# Patient Record
Sex: FEMALE | Race: BLACK OR AFRICAN AMERICAN | NOT HISPANIC OR LATINO | Employment: FULL TIME | URBAN - METROPOLITAN AREA
[De-identification: names, ages, dates, MRNs, and addresses within clinical notes are randomized per-mention and may not be internally consistent; named-entity substitution may affect disease eponyms.]

---

## 2020-12-17 ENCOUNTER — OFFICE VISIT (OUTPATIENT)
Dept: URGENT CARE | Facility: CLINIC | Age: 55
End: 2020-12-17
Payer: COMMERCIAL

## 2020-12-17 VITALS
WEIGHT: 162 LBS | BODY MASS INDEX: 31.8 KG/M2 | SYSTOLIC BLOOD PRESSURE: 158 MMHG | TEMPERATURE: 98 F | OXYGEN SATURATION: 99 % | RESPIRATION RATE: 14 BRPM | HEIGHT: 60 IN | DIASTOLIC BLOOD PRESSURE: 88 MMHG | HEART RATE: 80 BPM

## 2020-12-17 DIAGNOSIS — U07.1 COVID-19 VIRUS DETECTED: ICD-10-CM

## 2020-12-17 DIAGNOSIS — Z03.818 ENCOUNTER FOR OBSERVATION FOR SUSPECTED EXPOSURE TO OTHER BIOLOGICAL AGENTS RULED OUT: ICD-10-CM

## 2020-12-17 DIAGNOSIS — U07.1 COVID-19: Primary | ICD-10-CM

## 2020-12-17 LAB
CTP QC/QA: YES
SARS-COV-2 RDRP RESP QL NAA+PROBE: POSITIVE

## 2020-12-17 PROCEDURE — 99203 OFFICE O/P NEW LOW 30 MIN: CPT | Mod: S$GLB,,, | Performed by: PHYSICIAN ASSISTANT

## 2020-12-17 PROCEDURE — U0002 COVID-19 LAB TEST NON-CDC: HCPCS | Mod: QW,S$GLB,, | Performed by: PHYSICIAN ASSISTANT

## 2020-12-17 PROCEDURE — U0002: ICD-10-PCS | Mod: QW,S$GLB,, | Performed by: PHYSICIAN ASSISTANT

## 2020-12-17 PROCEDURE — 3008F BODY MASS INDEX DOCD: CPT | Mod: CPTII,S$GLB,, | Performed by: PHYSICIAN ASSISTANT

## 2020-12-17 PROCEDURE — 99203 PR OFFICE/OUTPT VISIT, NEW, LEVL III, 30-44 MIN: ICD-10-PCS | Mod: S$GLB,,, | Performed by: PHYSICIAN ASSISTANT

## 2020-12-17 PROCEDURE — 3008F PR BODY MASS INDEX (BMI) DOCUMENTED: ICD-10-PCS | Mod: CPTII,S$GLB,, | Performed by: PHYSICIAN ASSISTANT

## 2020-12-17 RX ORDER — ERGOCALCIFEROL 1.25 MG/1
50000 CAPSULE ORAL
COMMUNITY
End: 2024-01-26

## 2020-12-17 RX ORDER — OMEPRAZOLE 10 MG/1
20 CAPSULE, DELAYED RELEASE ORAL DAILY PRN
COMMUNITY
End: 2024-01-26

## 2020-12-17 RX ORDER — PROMETHAZINE HYDROCHLORIDE AND DEXTROMETHORPHAN HYDROBROMIDE 6.25; 15 MG/5ML; MG/5ML
5 SYRUP ORAL 3 TIMES DAILY PRN
Qty: 180 ML | Refills: 0 | Status: SHIPPED | OUTPATIENT
Start: 2020-12-17 | End: 2020-12-27

## 2020-12-17 NOTE — PROGRESS NOTES
Subjective:       Patient ID: Erasmo Dickens is a 55 y.o. female.    Vitals:  height is 5' (1.524 m) and weight is 73.5 kg (162 lb). Her temperature is 97.5 °F (36.4 °C). Her blood pressure is 158/88 (abnormal) and her pulse is 80. Her respiration is 14 and oxygen saturation is 99%.     Chief Complaint: Nasal Congestion    Patient endorses 7 day history of loss of smile, sinus congestion, and mild intermittent dry cough.  Afebrile.  Denies any sick contacts that she is aware of.  Denies chest pain, shortness of breath, GI upset or abdominal pain.    Other  This is a new problem. The current episode started yesterday. The problem occurs constantly. The problem has been unchanged. Associated symptoms include congestion and coughing. Pertinent negatives include no abdominal pain, anorexia, arthralgias, change in bowel habit, chest pain, chills, diaphoresis, fatigue, fever, headaches, joint swelling, myalgias, nausea, neck pain, numbness, rash, sore throat, swollen glands, urinary symptoms, vertigo, visual change, vomiting or weakness. Nothing aggravates the symptoms. She has tried acetaminophen (tylenol) for the symptoms. The treatment provided mild relief.       Constitution: Negative for chills, sweating, fatigue and fever.   HENT: Positive for congestion and sinus pressure. Negative for sore throat.    Neck: Negative for neck pain and painful lymph nodes.   Cardiovascular: Negative for chest pain and leg swelling.   Eyes: Negative for double vision and blurred vision.   Respiratory: Positive for cough. Negative for sputum production and shortness of breath.    Gastrointestinal: Negative for abdominal pain, nausea, vomiting and diarrhea.   Genitourinary: Negative for dysuria, frequency, urgency and history of kidney stones.   Musculoskeletal: Negative for joint pain, joint swelling, muscle cramps and muscle ache.   Skin: Negative for color change, pale, rash and bruising.   Allergic/Immunologic: Negative for seasonal  allergies.   Neurological: Negative for dizziness, history of vertigo, light-headedness, passing out, headaches and numbness.   Hematologic/Lymphatic: Negative for swollen lymph nodes.   Psychiatric/Behavioral: Negative for nervous/anxious, sleep disturbance and depression. The patient is not nervous/anxious.        Objective:      Physical Exam   Constitutional: She is oriented to person, place, and time. She appears well-developed. She is cooperative.  Non-toxic appearance. She does not appear ill. No distress.   HENT:   Head: Normocephalic and atraumatic.   Ears:   Right Ear: Hearing, external ear and ear canal normal. Tympanic membrane is not injected, not erythematous, not retracted and not bulging. A middle ear effusion (mild; serous) is present. impacted cerumen  Left Ear: Hearing, external ear and ear canal normal. Tympanic membrane is not injected, not erythematous, not retracted and not bulging.  No middle ear effusion. impacted cerumen  Nose: Nose normal. No mucosal edema, rhinorrhea, nasal deformity or congestion. No epistaxis. Right sinus exhibits no maxillary sinus tenderness and no frontal sinus tenderness. Left sinus exhibits no maxillary sinus tenderness and no frontal sinus tenderness.   Mouth/Throat: Uvula is midline, oropharynx is clear and moist and mucous membranes are normal. No trismus in the jaw. Normal dentition. No uvula swelling. No oropharyngeal exudate, posterior oropharyngeal edema, posterior oropharyngeal erythema, tonsillar abscesses or cobblestoning. No tonsillar exudate.   Eyes: Conjunctivae and lids are normal. Right eye exhibits no discharge. Left eye exhibits no discharge. No scleral icterus.   Neck: Trachea normal, full passive range of motion without pain and phonation normal. Neck supple. No neck rigidity. No edema and no erythema present.   Cardiovascular: Normal rate, regular rhythm, normal heart sounds and normal pulses. Exam reveals no gallop and no friction rub.   No  murmur heard.  Pulmonary/Chest: Effort normal and breath sounds normal. No stridor. No respiratory distress. She has no decreased breath sounds. She has no wheezes. She has no rhonchi. She has no rales.   CTA bilaterally; NAD    Comments: CTA bilaterally; NAD    Abdominal: Normal appearance.   Musculoskeletal: Normal range of motion.         General: No deformity.   Lymphadenopathy:        Head (right side): No submandibular and no tonsillar adenopathy present.        Head (left side): No submandibular and no tonsillar adenopathy present.     She has no cervical adenopathy.        Right cervical: No superficial cervical and no posterior cervical adenopathy present.       Left cervical: No superficial cervical and no posterior cervical adenopathy present.   Neurological: She is alert and oriented to person, place, and time. She exhibits normal muscle tone. Coordination normal.   Skin: Skin is warm, dry, intact, not diaphoretic and not pale. Psychiatric: Her speech is normal and behavior is normal. Judgment and thought content normal.   Nursing note and vitals reviewed.        Results for orders placed or performed in visit on 12/17/20   POCT COVID-19 Rapid Screening   Result Value Ref Range    POC Rapid COVID Positive (A) Negative     Acceptable Yes        Assessment:       1. COVID-19    2. Encounter for observation for suspected exposure to other biological agents ruled out        Plan:       Covid-19 molecular testing done today and is positive at this time. Reviewed results with patient. This is a viral infection and will require no antibiotics at this time for treatment. Discussed to quarantine for 10 days from symptom onset and the last 3 days must be fever free without fever reducing medicines before resuming normal daily activities. Discussed ER precautions with patient and should they develop any chest pain or shortness of breath that does not resolve with rest, they should go to the emergency  room.     Rest and fluids are important.  Please take tylenol for help with fever, pain.  Mucinex can help with chest congestion.  Tessalon perrles can be used as directed as needed to help with cough.  Albuterol inhaler can be used as directed needed for wheezing as we have discussed.    If you develop worsening symptoms, especially shortness of breath or difficulty breathing, please go to the ED for further evaluation.    COVID-19  -     promethazine-dextromethorphan (PROMETHAZINE-DM) 6.25-15 mg/5 mL Syrp; Take 5 mLs by mouth 3 (three) times daily as needed (cough).  Dispense: 180 mL; Refill: 0    Encounter for observation for suspected exposure to other biological agents ruled out  -     POCT COVID-19 Rapid Screening      Patient Instructions   Instructions for Patients with Confirmed or Suspected COVID-19    If you are awaiting your test result, you will either be called or it will be released to the patient portal.  If you have any questions about your test, please visit [email or web address] or call our COVID-19 information line at [phone].      Instructions for non-hospitalized or discharged patients with confirmed or suspected COVID-19:       Stay home except to get medical care.    Separate yourself from other people and animals in your home.    Call ahead before visiting your doctor.    Wear a face mask.    Cover your coughs and sneezes.    Clean your hands often.    Avoid sharing personal household items.    Clean all high-touch surfaces every day.    Monitor your symptoms. Seek prompt medical attention if your illness is worsening (e.g., difficulty breathing). Before seeking care, call your healthcare provider.    If you have a medical emergency and must call 911, notify the dispatcher that you have or are being evaluated for COVID-19. If possible, put on a face mask before emergency medical services arrive.    Use the following symptom-based strategy to return to normal  activity following a suspected or confirmed case of COVID-19. Continue isolation until:   o At least 3 days (72 hours) have passed since recovery defined as resolution of fever without the use of fever-reducing medications and improvement in respiratory symptoms (e.g. cough, shortness of breath), and   o At least 10 days have passed since the first positive test.       As one of the next steps, you will receive a call or text from the [place] Department of Health ([facility]) COVID-19 Tracing Team. See the contact information below so you know not to ignore the health departments call. It is important that you contact them back immediately so they can help.     Contact Tracer Number:  [phone]  Caller ID for most carriers: [facility]    What is contact tracing?   Contact tracing is a process that helps identify everyone who has been in close contact with an infected person. Contact tracers let those people know they may have been exposed and guide them on next steps. Confidentiality is important for everyone; no one will be told who may have exposed them to the virus.   Your involvement is important. The more we know about where and how this virus is spreading, the better chance we have at stopping it from spreading further.  What does exposure mean?   Exposure means you have been within 6 feet for more than 15 minutes with a person who has or had COVID-19.  What kind of questions do the contact tracers ask?   A contact tracer will confirm your basic contact information including name, address, phone number, and next of kin, as well as asking about any symptoms you may have had. Theyll also ask you how you think you may have gotten sick, such as places where you may have been exposed to the virus, and people you were with. Those names will never be shared with anyone outside of that call, and will only be used to help trace and stop the spread of the virus.   I have privacy concerns. How will the state  use my information?   Your privacy about your health is important. All calls are completed using call centers that use the appropriate health privacy protection measures (HIPAA compliance), meaning that your patient information is safe. No one will ever ask you any questions related to immigration status. Your health comes first.   Do I have to participate?   You do not have to participate, but we strongly encourage you to. Contact tracing can help us catch and control new outbreaks as theyre developing to keep your friends and family safe.   What if I dont hear from anyone?   If you dont receive a call within 24 hours, you can call the number above right away to inquire about your status. That line is open from 8:00 am - 8:00 p.m., 7 days a week.  Contact tracing saves lives! Together, we have the power to beat this virus and keep our loved ones and neighbors safe.       Instructions for household members, intimate partners and caregivers in a non-healthcare setting of a patient with confirmed or suspected COVID-19:         Close contacts should monitor their health and call their healthcare provider right away if they develop symptoms suggestive of COVID-19 (e.g., fever, cough, shortness of breath).    Stay home except to get medical care. Separate yourself from other people and animals in the home.   Monitor the patients symptoms. If the patient is getting sicker, call his or her healthcare provider. If the patient has a medical emergency and you need to call 911, notify the dispatch personnel that the patient has or is being evaluated for COVID-19.    Wear a facemask when around other people such as sharing a room or vehicle and before entering a healthcare provider's office.   Cover coughs and sneezes with a tissue. Throw used tissues in a lined trash can immediately and wash hands.   Clean hands often with soap and water for at least 20 seconds or with an alcohol-based hand , rubbing  hands together until they feel dry. Avoid touching your eyes, nose, and mouth with unwashed hands.   Clean all high-touch; surfaces every day, including counters, tabletops, doorknobs, bathroom fixtures, toilets, phones, keyboards, tablets, bedside tables, etc. Use a household cleaning spray or wipe according to label instructions.   Avoid sharing personal household items such as dishes, drinking glasses, cups, towels, bedding, etc. After these items are used, they should be washed thoroughly with soap and water.   Continue isolation until:   At least 3 days (72 hours) have passed since recovery defined as resolution of fever without the use of fever-reducing medications and improvement in respiratory symptoms (e.g. cough, shortness of breath), and    At least 10 days have passed since the patients first positive test.    https://www.cdc.gov/coronavirus/2019-ncov/your-health/index.htm      Please note that patients who test positive for COVID-19 are required by the CDC to undergo isolation for 10 days after their symptoms first began.    This isolation starts from the day you first developed symptoms, not the day of your positive test. For example, if your symptoms began on a Monday but tested positive on the following Wednesday, your 10-day isolation begins from that Monday, not the Wednesday you tested positive.    However, if you are asymptomatic (a person who does not have any symptoms) and COVID-19 positive, your 10-day isolation begins on the day you tested positive, regardless of exposure date.  Also, per the CDC guidelines, once your 10 days have passed, and you have not had fever greater than 100.4F in the last 24 hours without taking any fever reducers such as Tylenol (Acetaminophen) or Motrin (Ibuprofen), you may return to your normal activities including social distancing, wearing masks, and frequent handwashing - YOU DO NOT NEED ANOTHER TEST IN ORDER TO END YOUR QUARANTINE.      Please follow up  with your Primary care provider within 2-5 days if your signs and symptoms have not resolved or worsen.     If your condition worsens or fails to improve we recommend that you receive another evaluation at the emergency room immediately or contact your primary medical clinic to discuss your concerns.   You must understand that you have received an Urgent Care treatment only and that you may be released before all of your medical problems are known or treated. You, the patient, will arrange for follow up care as instructed.     RED FLAGS/WARNING SYMPTOMS DISCUSSED WITH PATIENT THAT WOULD WARRANT EMERGENT MEDICAL ATTENTION. PATIENT VERBALIZED UNDERSTANDING.

## 2020-12-17 NOTE — LETTER
[address] [facility] ? [place], [address] ? Phone [phone] ? Fax [phone]           Return to Work/School    Patient: Erasmo Dickens  YOB: 1965   Date: 12/17/2020      To Whom It May Concern:     Erasmo Dickens was in contact with/seen in my office on 12/17/2020. COVID-19 is present in our communities across the state. Not all patients are eligible or appropriate to be tested. In this situation, your employee meets the following criteria:     Erasmo Dickens has met the criteria for COVID-19 testing and has a POSITIVE result. She can return to work once they are asymptomatic for 24 hours without the use of fever reducing medications AND at least ten days from the start of symptoms (or from the first positive result if they have no symptoms).      If you have any questions or concerns, or if I can be of further assistance, please do not hesitate to contact me.     Sincerely,      Joslyn Benjamin PA-C

## 2020-12-18 NOTE — PATIENT INSTRUCTIONS
Instructions for Patients with Confirmed or Suspected COVID-19    If you are awaiting your test result, you will either be called or it will be released to the patient portal.  If you have any questions about your test, please visit [email or web address] or call our COVID-19 information line at [phone].      Instructions for non-hospitalized or discharged patients with confirmed or suspected COVID-19:       Stay home except to get medical care.    Separate yourself from other people and animals in your home.    Call ahead before visiting your doctor.    Wear a face mask.    Cover your coughs and sneezes.    Clean your hands often.    Avoid sharing personal household items.    Clean all high-touch surfaces every day.    Monitor your symptoms. Seek prompt medical attention if your illness is worsening (e.g., difficulty breathing). Before seeking care, call your healthcare provider.    If you have a medical emergency and must call 911, notify the dispatcher that you have or are being evaluated for COVID-19. If possible, put on a face mask before emergency medical services arrive.    Use the following symptom-based strategy to return to normal activity following a suspected or confirmed case of COVID-19. Continue isolation until:   o At least 3 days (72 hours) have passed since recovery defined as resolution of fever without the use of fever-reducing medications and improvement in respiratory symptoms (e.g. cough, shortness of breath), and   o At least 10 days have passed since the first positive test.       As one of the next steps, you will receive a call or text from the [place] Department of Health ([facility]) COVID-19 Tracing Team. See the contact information below so you know not to ignore the health departments call. It is important that you contact them back immediately so they can help.     Contact Tracer Number:  [phone]  Caller ID for most carriers: [place] [facility]    What is  contact tracing?   Contact tracing is a process that helps identify everyone who has been in close contact with an infected person. Contact tracers let those people know they may have been exposed and guide them on next steps. Confidentiality is important for everyone; no one will be told who may have exposed them to the virus.   Your involvement is important. The more we know about where and how this virus is spreading, the better chance we have at stopping it from spreading further.  What does exposure mean?   Exposure means you have been within 6 feet for more than 15 minutes with a person who has or had COVID-19.  What kind of questions do the contact tracers ask?   A contact tracer will confirm your basic contact information including name, address, phone number, and next of kin, as well as asking about any symptoms you may have had. Theyll also ask you how you think you may have gotten sick, such as places where you may have been exposed to the virus, and people you were with. Those names will never be shared with anyone outside of that call, and will only be used to help trace and stop the spread of the virus.   I have privacy concerns. How will the state use my information?   Your privacy about your health is important. All calls are completed using call centers that use the appropriate health privacy protection measures (HIPAA compliance), meaning that your patient information is safe. No one will ever ask you any questions related to immigration status. Your health comes first.   Do I have to participate?   You do not have to participate, but we strongly encourage you to. Contact tracing can help us catch and control new outbreaks as theyre developing to keep your friends and family safe.   What if I dont hear from anyone?   If you dont receive a call within 24 hours, you can call the number above right away to inquire about your status. That line is open from 8:00 am - 8:00 p.m., 7 days a  week.  Contact tracing saves lives! Together, we have the power to beat this virus and keep our loved ones and neighbors safe.       Instructions for household members, intimate partners and caregivers in a non-healthcare setting of a patient with confirmed or suspected COVID-19:         Close contacts should monitor their health and call their healthcare provider right away if they develop symptoms suggestive of COVID-19 (e.g., fever, cough, shortness of breath).    Stay home except to get medical care. Separate yourself from other people and animals in the home.   Monitor the patients symptoms. If the patient is getting sicker, call his or her healthcare provider. If the patient has a medical emergency and you need to call 911, notify the dispatch personnel that the patient has or is being evaluated for COVID-19.    Wear a facemask when around other people such as sharing a room or vehicle and before entering a healthcare provider's office.   Cover coughs and sneezes with a tissue. Throw used tissues in a lined trash can immediately and wash hands.   Clean hands often with soap and water for at least 20 seconds or with an alcohol-based hand , rubbing hands together until they feel dry. Avoid touching your eyes, nose, and mouth with unwashed hands.   Clean all high-touch; surfaces every day, including counters, tabletops, doorknobs, bathroom fixtures, toilets, phones, keyboards, tablets, bedside tables, etc. Use a household cleaning spray or wipe according to label instructions.   Avoid sharing personal household items such as dishes, drinking glasses, cups, towels, bedding, etc. After these items are used, they should be washed thoroughly with soap and water.   Continue isolation until:   At least 3 days (72 hours) have passed since recovery defined as resolution of fever without the use of fever-reducing medications and improvement in respiratory symptoms (e.g. cough, shortness of breath),  and    At least 10 days have passed since the patients first positive test.    https://www.cdc.gov/coronavirus/2019-ncov/your-health/index.htm      Please note that patients who test positive for COVID-19 are required by the CDC to undergo isolation for 10 days after their symptoms first began.    This isolation starts from the day you first developed symptoms, not the day of your positive test. For example, if your symptoms began on a Monday but tested positive on the following Wednesday, your 10-day isolation begins from that Monday, not the Wednesday you tested positive.    However, if you are asymptomatic (a person who does not have any symptoms) and COVID-19 positive, your 10-day isolation begins on the day you tested positive, regardless of exposure date.  Also, per the CDC guidelines, once your 10 days have passed, and you have not had fever greater than 100.4F in the last 24 hours without taking any fever reducers such as Tylenol (Acetaminophen) or Motrin (Ibuprofen), you may return to your normal activities including social distancing, wearing masks, and frequent handwashing - YOU DO NOT NEED ANOTHER TEST IN ORDER TO END YOUR QUARANTINE.      Please follow up with your Primary care provider within 2-5 days if your signs and symptoms have not resolved or worsen.     If your condition worsens or fails to improve we recommend that you receive another evaluation at the emergency room immediately or contact your primary medical clinic to discuss your concerns.   You must understand that you have received an Urgent Care treatment only and that you may be released before all of your medical problems are known or treated. You, the patient, will arrange for follow up care as instructed.     RED FLAGS/WARNING SYMPTOMS DISCUSSED WITH PATIENT THAT WOULD WARRANT EMERGENT MEDICAL ATTENTION. PATIENT VERBALIZED UNDERSTANDING.

## 2023-09-11 ENCOUNTER — OFFICE VISIT (OUTPATIENT)
Dept: URGENT CARE | Facility: CLINIC | Age: 58
End: 2023-09-11
Payer: COMMERCIAL

## 2023-09-11 VITALS
SYSTOLIC BLOOD PRESSURE: 158 MMHG | HEART RATE: 75 BPM | DIASTOLIC BLOOD PRESSURE: 90 MMHG | TEMPERATURE: 98 F | WEIGHT: 162 LBS | HEIGHT: 60 IN | RESPIRATION RATE: 16 BRPM | BODY MASS INDEX: 31.8 KG/M2 | OXYGEN SATURATION: 98 %
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
## 2023-09-11 DIAGNOSIS — Z02.6 ENCOUNTER RELATED TO WORKER'S COMPENSATION CLAIM: ICD-10-CM

## 2023-09-11 LAB
CTP QC/QA: YES
POC 5 PANEL DRUG SCREEN: NEGATIVE

## 2023-09-11 PROCEDURE — 80305 POCT RAPID DRUG SCREEN 5 PANEL: ICD-10-PCS | Mod: S$GLB,,, | Performed by: NURSE PRACTITIONER

## 2023-09-11 PROCEDURE — 99212 PR OFFICE/OUTPT VISIT, EST, LEVL II, 10-19 MIN: ICD-10-PCS | Mod: S$GLB,,, | Performed by: NURSE PRACTITIONER

## 2023-09-11 PROCEDURE — 99212 OFFICE O/P EST SF 10 MIN: CPT | Mod: S$GLB,,, | Performed by: NURSE PRACTITIONER

## 2023-09-11 PROCEDURE — 80305 DRUG TEST PRSMV DIR OPT OBS: CPT | Mod: S$GLB,,, | Performed by: NURSE PRACTITIONER
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
## 2023-09-12 ENCOUNTER — OFFICE VISIT (OUTPATIENT)
Dept: URGENT CARE | Facility: CLINIC | Age: 58
End: 2023-09-12
Payer: COMMERCIAL

## 2023-09-12 VITALS
HEIGHT: 60 IN | RESPIRATION RATE: 18 BRPM | SYSTOLIC BLOOD PRESSURE: 160 MMHG | WEIGHT: 178 LBS | HEART RATE: 67 BPM | TEMPERATURE: 98 F | DIASTOLIC BLOOD PRESSURE: 93 MMHG | OXYGEN SATURATION: 97 % | BODY MASS INDEX: 34.95 KG/M2

## 2023-09-12 DIAGNOSIS — S16.1XXD STRAIN OF NECK MUSCLE, SUBSEQUENT ENCOUNTER: Primary | ICD-10-CM
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
## 2023-09-12 DIAGNOSIS — Z02.6 ENCOUNTER RELATED TO WORKER'S COMPENSATION CLAIM: ICD-10-CM

## 2023-09-12 PROCEDURE — 99214 OFFICE O/P EST MOD 30 MIN: CPT | Mod: S$GLB,,, | Performed by: STUDENT IN AN ORGANIZED HEALTH CARE EDUCATION/TRAINING PROGRAM

## 2023-09-12 PROCEDURE — 99214 PR OFFICE/OUTPT VISIT, EST, LEVL IV, 30-39 MIN: ICD-10-PCS | Mod: S$GLB,,, | Performed by: STUDENT IN AN ORGANIZED HEALTH CARE EDUCATION/TRAINING PROGRAM
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
## 2023-09-12 NOTE — PATIENT INSTRUCTIONS
You have been seen for a work-related injury/ailment. You are released to go home and you need to follow up with [facility] Work [facility] on the next business day.  Please call [email or web address] for help setting up the appointment.      Please drink plenty of fluids.  Please get plenty of rest.  Please return here or go to the Emergency Department for any concerns or worsening of condition.  If you were prescribed a narcotic medication, do not drive or operate heavy equipment or machinery while taking these medications.  If you were not prescribed an anti-inflammatory medication, and if you do not have any history of stomach/intestinal ulcers, or kidney disease, or are not taking a blood thinner such as Coumadin, Plavix, Pradaxa, Eloquis, or Xaralta for example, it is OK to take over the counter Ibuprofen or Advil or Motrin or Aleve as directed.  Do not take these medications on an empty stomach.  Rest, ice, compression and elevation to the affected joint or limb as needed.  Please follow up with your primary care doctor or specialist as needed.    If you  smoke, please stop smoking.

## 2023-09-19 ENCOUNTER — OFFICE VISIT (OUTPATIENT)
Dept: URGENT CARE | Facility: CLINIC | Age: 58
End: 2023-09-19
Payer: COMMERCIAL

## 2023-09-19 VITALS
HEIGHT: 60 IN | TEMPERATURE: 99 F | RESPIRATION RATE: 18 BRPM | DIASTOLIC BLOOD PRESSURE: 93 MMHG | BODY MASS INDEX: 34.95 KG/M2 | HEART RATE: 78 BPM | WEIGHT: 178 LBS | OXYGEN SATURATION: 97 % | SYSTOLIC BLOOD PRESSURE: 142 MMHG

## 2023-09-19 DIAGNOSIS — S13.4XXD WHIPLASH INJURIES, SUBSEQUENT ENCOUNTER: ICD-10-CM

## 2023-09-19 DIAGNOSIS — Z02.6 ENCOUNTER RELATED TO WORKER'S COMPENSATION CLAIM: ICD-10-CM

## 2023-09-19 DIAGNOSIS — S46.812D STRAIN OF LEFT TRAPEZIUS MUSCLE, SUBSEQUENT ENCOUNTER: Primary | ICD-10-CM

## 2023-09-19 PROCEDURE — 99214 OFFICE O/P EST MOD 30 MIN: CPT | Mod: S$GLB,,, | Performed by: STUDENT IN AN ORGANIZED HEALTH CARE EDUCATION/TRAINING PROGRAM

## 2023-09-19 PROCEDURE — 99214 PR OFFICE/OUTPT VISIT, EST, LEVL IV, 30-39 MIN: ICD-10-PCS | Mod: S$GLB,,, | Performed by: STUDENT IN AN ORGANIZED HEALTH CARE EDUCATION/TRAINING PROGRAM

## 2023-09-19 RX ORDER — IBUPROFEN 600 MG/1
600 TABLET ORAL EVERY 8 HOURS PRN
Qty: 30 TABLET | Refills: 0 | Status: SHIPPED | OUTPATIENT
Start: 2023-09-19 | End: 2024-01-26

## 2023-09-19 RX ORDER — METHOCARBAMOL 500 MG/1
500 TABLET, FILM COATED ORAL NIGHTLY PRN
Qty: 30 TABLET | Refills: 0 | Status: SHIPPED | OUTPATIENT
Start: 2023-09-19 | End: 2023-10-25
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
## 2023-10-03 ENCOUNTER — OFFICE VISIT (OUTPATIENT)
Dept: URGENT CARE | Facility: CLINIC | Age: 58
End: 2023-10-03
Payer: COMMERCIAL

## 2023-10-03 VITALS
OXYGEN SATURATION: 97 % | HEIGHT: 60 IN | DIASTOLIC BLOOD PRESSURE: 91 MMHG | RESPIRATION RATE: 18 BRPM | BODY MASS INDEX: 34.95 KG/M2 | HEART RATE: 63 BPM | SYSTOLIC BLOOD PRESSURE: 161 MMHG | WEIGHT: 178 LBS

## 2023-10-03 DIAGNOSIS — S46.812D STRAIN OF LEFT TRAPEZIUS MUSCLE, SUBSEQUENT ENCOUNTER: Primary | ICD-10-CM

## 2023-10-03 DIAGNOSIS — Z02.6 ENCOUNTER RELATED TO WORKER'S COMPENSATION CLAIM: ICD-10-CM

## 2023-10-03 PROCEDURE — 99213 OFFICE O/P EST LOW 20 MIN: CPT | Mod: S$GLB,,, | Performed by: STUDENT IN AN ORGANIZED HEALTH CARE EDUCATION/TRAINING PROGRAM

## 2023-10-03 PROCEDURE — 99213 PR OFFICE/OUTPT VISIT, EST, LEVL III, 20-29 MIN: ICD-10-PCS | Mod: S$GLB,,, | Performed by: STUDENT IN AN ORGANIZED HEALTH CARE EDUCATION/TRAINING PROGRAM
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
## 2023-10-03 NOTE — LETTER
[facility]  [address] [address]  Phone: [phone]  Fax: [phone]  [facility]: [id number]    Pt Name: Erasmo Lopez Date: 09/11/2023   Employee ID: [id number] Date of Treatment: 10/03/2023   Company: [employer]      Appointment Time: 1:30 PM Arrived: 1:25 PM   Provider: Karissa Keating MD Time Out:2:05 PM     Office Treatment:   1. Strain of left trapezius muscle, subsequent encounter    2. Encounter related to worker's compensation claim          Patient Instructions: Attention not to aggravate affected area, Daily home exercises/warm soaks, PT to be scheduled once authorized      Restrictions: Regular Duty     Return Appointment: 10/25/2023 at 1:00 PM  QUINN

## 2023-10-25 ENCOUNTER — OFFICE VISIT (OUTPATIENT)
Dept: URGENT CARE | Facility: CLINIC | Age: 58
End: 2023-10-25
Payer: COMMERCIAL

## 2023-10-25 VITALS
OXYGEN SATURATION: 97 % | WEIGHT: 178 LBS | BODY MASS INDEX: 34.95 KG/M2 | DIASTOLIC BLOOD PRESSURE: 96 MMHG | SYSTOLIC BLOOD PRESSURE: 160 MMHG | HEART RATE: 67 BPM | RESPIRATION RATE: 18 BRPM | HEIGHT: 60 IN

## 2023-10-25 DIAGNOSIS — S46.812D STRAIN OF LEFT TRAPEZIUS MUSCLE, SUBSEQUENT ENCOUNTER: Primary | ICD-10-CM

## 2023-10-25 DIAGNOSIS — Z02.6 ENCOUNTER RELATED TO WORKER'S COMPENSATION CLAIM: ICD-10-CM

## 2023-10-25 PROCEDURE — 99214 OFFICE O/P EST MOD 30 MIN: CPT | Mod: S$GLB,,, | Performed by: STUDENT IN AN ORGANIZED HEALTH CARE EDUCATION/TRAINING PROGRAM

## 2023-10-25 PROCEDURE — 99214 PR OFFICE/OUTPT VISIT, EST, LEVL IV, 30-39 MIN: ICD-10-PCS | Mod: S$GLB,,, | Performed by: STUDENT IN AN ORGANIZED HEALTH CARE EDUCATION/TRAINING PROGRAM

## 2023-10-25 RX ORDER — NAPROXEN 500 MG/1
500 TABLET ORAL 2 TIMES DAILY WITH MEALS
Qty: 20 TABLET | Refills: 0 | Status: SHIPPED | OUTPATIENT
Start: 2023-10-25 | End: 2024-01-26

## 2023-10-25 RX ORDER — METHOCARBAMOL 750 MG/1
750 TABLET, FILM COATED ORAL 2 TIMES DAILY PRN
Qty: 30 TABLET | Refills: 0 | Status: SHIPPED | OUTPATIENT
Start: 2023-10-25 | End: 2024-01-26

## 2023-10-25 NOTE — LETTER
[facility]  [address] [address]  Phone: [phone]  Fax: [phone]  [facility]: [id number]    Pt Name: Erasmo Lopez Date: 09/11/2023   Employee ID:[id number] Date of Treatment: 10/25/2023   Company: [employer]      Appointment Time: 12:45 PM Arrived: 12:57pm   Provider: Karissa Keating MD Time Out:2:00pm     Office Treatment:   1. Strain of left trapezius muscle, subsequent encounter    2. Encounter related to worker's compensation claim      Medications Ordered This Encounter   Medications    methocarbamoL (ROBAXIN) 750 MG Tab    naproxen (NAPROSYN) 500 MG tablet          Patient Instructions: PT to be scheduled once authorized, Attention not to aggravate affected area, Daily home exercises/warm soaks      Restrictions: Regular Duty     Return Appointment: 11/8/2023 at 1:00pm  QUINN
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
## 2023-11-02 ENCOUNTER — CLINICAL SUPPORT (OUTPATIENT)
Dept: REHABILITATION | Facility: HOSPITAL | Age: 58
End: 2023-11-02
Payer: COMMERCIAL

## 2023-11-02 DIAGNOSIS — S46.812A STRAIN OF LEFT TRAPEZIUS MUSCLE, INITIAL ENCOUNTER: Primary | ICD-10-CM

## 2023-11-02 PROCEDURE — 97110 THERAPEUTIC EXERCISES: CPT

## 2023-11-02 PROCEDURE — 97161 PT EVAL LOW COMPLEX 20 MIN: CPT
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
## 2023-11-06 ENCOUNTER — CLINICAL SUPPORT (OUTPATIENT)
Dept: REHABILITATION | Facility: HOSPITAL | Age: 58
End: 2023-11-06
Payer: COMMERCIAL

## 2023-11-06 DIAGNOSIS — S46.812A STRAIN OF LEFT TRAPEZIUS MUSCLE, INITIAL ENCOUNTER: Primary | ICD-10-CM

## 2023-11-06 PROCEDURE — 97110 THERAPEUTIC EXERCISES: CPT

## 2023-11-06 PROCEDURE — 97140 MANUAL THERAPY 1/> REGIONS: CPT

## 2023-11-08 ENCOUNTER — OFFICE VISIT (OUTPATIENT)
Dept: URGENT CARE | Facility: CLINIC | Age: 58
End: 2023-11-08
Payer: COMMERCIAL

## 2023-11-08 ENCOUNTER — CLINICAL SUPPORT (OUTPATIENT)
Dept: REHABILITATION | Facility: HOSPITAL | Age: 58
End: 2023-11-08
Payer: COMMERCIAL

## 2023-11-08 VITALS
HEART RATE: 63 BPM | SYSTOLIC BLOOD PRESSURE: 144 MMHG | BODY MASS INDEX: 34.95 KG/M2 | RESPIRATION RATE: 18 BRPM | OXYGEN SATURATION: 97 % | DIASTOLIC BLOOD PRESSURE: 76 MMHG | WEIGHT: 178 LBS | HEIGHT: 60 IN

## 2023-11-08 DIAGNOSIS — S66.412A STRAIN OF INTRINSIC MUSCLE OF LEFT THUMB: ICD-10-CM

## 2023-11-08 DIAGNOSIS — S46.812D STRAIN OF LEFT TRAPEZIUS MUSCLE, SUBSEQUENT ENCOUNTER: Primary | ICD-10-CM

## 2023-11-08 DIAGNOSIS — Z02.6 ENCOUNTER RELATED TO WORKER'S COMPENSATION CLAIM: ICD-10-CM

## 2023-11-08 PROCEDURE — 99213 PR OFFICE/OUTPT VISIT, EST, LEVL III, 20-29 MIN: ICD-10-PCS | Mod: S$GLB,,,

## 2023-11-08 PROCEDURE — 99213 OFFICE O/P EST LOW 20 MIN: CPT | Mod: S$GLB,,,

## 2023-11-08 PROCEDURE — 97140 MANUAL THERAPY 1/> REGIONS: CPT

## 2023-11-08 PROCEDURE — 97110 THERAPEUTIC EXERCISES: CPT

## 2023-11-08 NOTE — LETTER
[facility]  [address] [address]  Phone: [phone]  Fax: [phone]  [facility]: [email or web address]    Pt Name: Erasmo Lopez Date: 09/11/2023   Employee ID: [id number] Date of Treatment: 11/08/2023   Company: [employer]      Appointment Time: 1:00 PM Arrived: 12:55 PM    Provider: Tommie Neri, DO Time Out:1:52 PM      Office Treatment:   1. Strain of left trapezius muscle, subsequent encounter    2. Encounter related to worker's compensation claim          Patient Instructions: Daily home exercises/warm soaks, Continue Physical Therapy      Restrictions: Regular Duty     Return Appointment: 11/29/2023 at 1:15 PM [facility]
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
## 2023-11-09 ENCOUNTER — TELEPHONE (OUTPATIENT)
Dept: URGENT CARE | Facility: CLINIC | Age: 58
End: 2023-11-09
Payer: COMMERCIAL
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
## 2023-11-13 ENCOUNTER — CLINICAL SUPPORT (OUTPATIENT)
Dept: REHABILITATION | Facility: HOSPITAL | Age: 58
End: 2023-11-13
Payer: COMMERCIAL

## 2023-11-13 DIAGNOSIS — S46.812D STRAIN OF LEFT TRAPEZIUS MUSCLE, SUBSEQUENT ENCOUNTER: Primary | ICD-10-CM

## 2023-11-13 PROCEDURE — 97110 THERAPEUTIC EXERCISES: CPT

## 2023-11-13 PROCEDURE — 97140 MANUAL THERAPY 1/> REGIONS: CPT

## 2023-11-15 PROBLEM — S46.812A STRAIN OF LEFT TRAPEZIUS MUSCLE: Status: ACTIVE | Noted: 2023-11-15

## 2023-11-15 NOTE — PROGRESS NOTES
.  [facility]   Physical Therapy Treatment Note      Name: Erasmo Dickens  [facility] Number: [id number]    Therapy Diagnosis:   Encounter Diagnosis   Name Primary?    Strain of left trapezius muscle, initial encounter Yes     Physician: Karissa Keating MD    Visit Date: 11/6/2023    Physician Orders: PT Eval and Treat  Medical Diagnosis from Referral: Strain of left trapezius muscle, subsequent encounter [S46.812D]   Evaluation Date: 11/2/2023  Authorization Period Expiration: 10/24/2023  Plan of Care Expiration: 1/2/2023  Progress Note Due: 12/2/2023  Date of Surgery: N/a  Visit # / Visits authorized: 2/ 6   FOTO: 1/ 3    PTA Visit #: 0/5       Subjective     Patient reports: her neck and shoulder feel tight. States that HEP is helping.  She was compliant with home exercise program.  Response to previous treatment: Initial Eval  Functional change: None    Pain: 5/10  Location: left trapezius    Objective      Objective Measures updated at progress report unless specified.     Treatment     Neci received the treatments listed below:    therapeutic exercises to develop strength, endurance, ROM, flexibility, posture, and core stabilization for 25 minutes including:  UBE 3'/3' (fwd/bckwd)  Upper Trap Stretch 3 x 30'  Levator Scap Stretch 3 x 30'  Shoulder Rows 3 x 10  Doorway Stretch 3 x 10  Wall Slides 3 x 10  CCW Wall Slides 3 x 10    manual therapy techniques for 20 minutes, including:  Grd IV Joint Mobs to C7-T1  STM to L Upper Trap    Patient Education and Home Exercises       Education provided:   - HEP    Written Home Exercises Provided: Patient instructed to cont prior HEP. Exercises were reviewed and Neci was able to demonstrate them prior to the end of the session.  Neci demonstrated good  understanding of the education provided. See Electronic Medical Record under Patient Instructions for exercises provided during therapy sessions    Assessment     Patient tolerated treatment session  well and had no complaints of pain with therex or manual therapy. Pt had decreased pain and improved cervical ROM after manual therapy. Cont to progress POC as tolerated.    Neci Is progressing well towards her goals.   Patient prognosis is Excellent.     Patient will continue to benefit from skilled outpatient physical therapy to address the deficits listed in the problem list box on initial evaluation, provide pt/family education and to maximize pt's level of independence in the home and community environment.     Patient's spiritual, cultural and educational needs considered and pt agreeable to plan of care and goals.     Anticipated barriers to physical therapy: None    Goals:   Goals:  Short Term Goals (4 Weeks):   1. Pt will be independent with HEP to supplement PT in improving pain free cervical mobility  2. Pt will improve cervical AROM 10-15%  in all planes to improve cervical mobility for driving  3. Pt will improve UE MMTs by 1/2 grade in all planes to improve strength for lifting and carrying tasks.  4. Pt will demonstrate improved sitting posture to decrease pain experienced in head and neck.  Long Term Goals (8 Weeks):   1. Pt will improve FOTO to >/=40% to improve perceived limitation with changing and maintaining mobility.  2. Pt will improve cervical AROM to WNL in all planes to improve cervical mobility for driving   3. Pt will improve UE MMTs 1 grade in all planes to improve strength for lifting and carrying tasks.  4. Pt will report no pain with lifting >5-10 lbs to promote physical activity.   5. Pt will report no pain with cervical AROM in all planes to promote QOL    Plan     Plan of care Certification: 11/2/2023 to 1/2/2023.     Outpatient Physical Therapy 1-2 times weekly for 8 weeks to include the following interventions: Aquatic Therapy, Cervical/Lumbar Traction, Gait Training, Manual Therapy, Moist Heat/ Ice, Neuromuscular Re-ed, Patient Education, Self Care, Therapeutic Activities,  Therapeutic Exercise, and Ultrasound.      Chris Garcia, PT          Chris Garcia, PT

## 2023-11-16 NOTE — PROGRESS NOTES
[place]  [facility]   Physical Therapy Treatment Note      Name: Erasmo Dickens  [facility] Number: [id number]    Therapy Diagnosis:   Encounter Diagnosis   Name Primary?    Strain of left trapezius muscle, subsequent encounter Yes       Physician: Karissa Keating MD    Visit Date: 11/8/2023    Physician Orders: PT Eval and Treat  Medical Diagnosis from Referral: Strain of left trapezius muscle, subsequent encounter [S46.812D]   Evaluation Date: 11/2/2023  Authorization Period Expiration: 10/24/2023  Plan of Care Expiration: 1/2/2023  Progress Note Due: 12/2/2023  Date of Surgery: N/a  Visit # / Visits authorized: 3/ 6   FOTO: 1/ 3    PTA Visit #: 0/5       Subjective     Patient reports: her neck and shoulder continue to see minor progress. States that her L trap and shoulder continue to feel tight  She was compliant with home exercise program.  Response to previous treatment: Initial Eval  Functional change: None    Pain: 5/10  Location: left trapezius    Objective      Objective Measures updated at progress report unless specified.     Treatment     Neci received the treatments listed below:    therapeutic exercises to develop strength, endurance, ROM, flexibility, posture, and core stabilization for 25 minutes including:  UBE 3'/3' (fwd/bckwd)  Upper Trap Stretch 3 x 30'  Levator Scap Stretch 3 x 30'  Shoulder Rows 3 x 10  Doorway Stretch 3 x 10  Wall Slides 3 x 10  CCW Wall Slides 3 x 10    manual therapy techniques for 20 minutes, including:  Grd IV Joint Mobs to C7-T1  STM to L Upper Trap    Patient Education and Home Exercises       Education provided:   - HEP    Written Home Exercises Provided: Patient instructed to cont prior HEP. Exercises were reviewed and Neci was able to demonstrate them prior to the end of the session.  Neci demonstrated good  understanding of the education provided. See Electronic Medical Record under Patient Instructions for exercises provided during therapy  sessions    Assessment     Patient tolerated treatment session well and had no complaints of pain with therex or manual therapy. Pt had decreased pain and improved cervical ROM after manual therapy. Cont to progress POC as tolerated.    Neci Is progressing well towards her goals.   Patient prognosis is Excellent.     Patient will continue to benefit from skilled outpatient physical therapy to address the deficits listed in the problem list box on initial evaluation, provide pt/family education and to maximize pt's level of independence in the home and community environment.     Patient's spiritual, cultural and educational needs considered and pt agreeable to plan of care and goals.     Anticipated barriers to physical therapy: None    Goals:   Goals:  Short Term Goals (4 Weeks):   1. Pt will be independent with HEP to supplement PT in improving pain free cervical mobility  2. Pt will improve cervical AROM 10-15%  in all planes to improve cervical mobility for driving  3. Pt will improve UE MMTs by 1/2 grade in all planes to improve strength for lifting and carrying tasks.  4. Pt will demonstrate improved sitting posture to decrease pain experienced in head and neck.  Long Term Goals (8 Weeks):   1. Pt will improve FOTO to >/=40% to improve perceived limitation with changing and maintaining mobility.  2. Pt will improve cervical AROM to WNL in all planes to improve cervical mobility for driving   3. Pt will improve UE MMTs 1 grade in all planes to improve strength for lifting and carrying tasks.  4. Pt will report no pain with lifting >5-10 lbs to promote physical activity.   5. Pt will report no pain with cervical AROM in all planes to promote QOL    Plan     Plan of care Certification: 11/2/2023 to 1/2/2023.     Outpatient Physical Therapy 1-2 times weekly for 8 weeks to include the following interventions: Aquatic Therapy, Cervical/Lumbar Traction, Gait Training, Manual Therapy, Moist Heat/ Ice, Neuromuscular  Re-ed, Patient Education, Self Care, Therapeutic Activities, Therapeutic Exercise, and Ultrasound.      Chris Garcia, PT          Chris Garcia, PT

## 2023-11-20 ENCOUNTER — CLINICAL SUPPORT (OUTPATIENT)
Dept: REHABILITATION | Facility: HOSPITAL | Age: 58
End: 2023-11-20
Payer: COMMERCIAL

## 2023-11-20 DIAGNOSIS — S46.812D STRAIN OF LEFT TRAPEZIUS MUSCLE, SUBSEQUENT ENCOUNTER: Primary | ICD-10-CM

## 2023-11-20 PROCEDURE — 97110 THERAPEUTIC EXERCISES: CPT

## 2023-11-20 PROCEDURE — 97140 MANUAL THERAPY 1/> REGIONS: CPT

## 2023-11-21 ENCOUNTER — CLINICAL SUPPORT (OUTPATIENT)
Dept: REHABILITATION | Facility: HOSPITAL | Age: 58
End: 2023-11-21
Payer: COMMERCIAL

## 2023-11-21 ENCOUNTER — TELEPHONE (OUTPATIENT)
Dept: URGENT CARE | Facility: CLINIC | Age: 58
End: 2023-11-21
Payer: COMMERCIAL

## 2023-11-21 DIAGNOSIS — M25.542 PAIN IN THUMB JOINT WITH MOVEMENT, LEFT: Primary | ICD-10-CM

## 2023-11-21 DIAGNOSIS — S46.812D STRAIN OF LEFT TRAPEZIUS MUSCLE, SUBSEQUENT ENCOUNTER: Primary | ICD-10-CM

## 2023-11-21 PROCEDURE — 97018 PARAFFIN BATH THERAPY: CPT

## 2023-11-21 PROCEDURE — 97165 OT EVAL LOW COMPLEX 30 MIN: CPT

## 2023-11-21 PROCEDURE — 97110 THERAPEUTIC EXERCISES: CPT

## 2023-11-21 NOTE — PATIENT INSTRUCTIONS
"[facility] - OCCUPATIONAL THERAPY  HOME EXERCISE PROGRAM     Complete the following exercises with 10 repetitions each, 4x/day.         AROM: Thumb Composite Flexion   Bend both joints of thumb as far as possible. Try to touch base of little finger.    AROM: Radial Adduction / Abduction  Place your palm flat on the table. Move thumb out to side. Move back alongside index finger.                                  AROM: Palmar Adduction / Abduction   Rest your small finger on the table. Move thumb sideways, out and away from palm. Move back to rest along palm.                     AROM: Opposition   Touch tip of thumb to nail tip of each finger in turn, making an "O" shape.  AROM: Composite Movement Circumduction  Make clockwise circles with thumb. Reverse and make counterclockwise circles with thumb.                AROM: Composite Flesion ("Pinky Slides")  Touch thumb to tip of small finger. Slide thumb down small finger into palm.     AROM: MP Extension   With palm on table, lift thumb up. Relax and lower thumb.    Therapist: ERROL Motta/DELIO     Copyright © [facility]. All rights reserved.     "

## 2023-11-21 NOTE — PROGRESS NOTES
[facility]  [facility]   Physical Therapy Treatment Note      Name: Erasmo Dickens  [facility] Number: [id number]    Therapy Diagnosis:   Encounter Diagnosis   Name Primary?    Strain of left trapezius muscle, subsequent encounter Yes         Physician: Karissa Keating MD    Visit Date: 11/13/2023    Physician Orders: PT Eval and Treat  Medical Diagnosis from Referral: Strain of left trapezius muscle, subsequent encounter [S46.812D]   Evaluation Date: 11/2/2023  Authorization Period Expiration: 10/24/2023  Plan of Care Expiration: 1/2/2023  Progress Note Due: 12/2/2023  Date of Surgery: N/a  Visit # / Visits authorized: 3/ 6   FOTO: 1/ 3    PTA Visit #: 0/5       Subjective     Patient reports: her neck and shoulder continue to see minor progress. States that her L trap and shoulder continue to feel tight. States that she has a streak of good days and then she'll lift something and it will flare up  She was compliant with home exercise program.  Response to previous treatment: Initial Eval  Functional change: None    Pain: 3/10  Location: left trapezius    Objective      Objective Measures updated at progress report unless specified.     Treatment     Neci received the treatments listed below:    therapeutic exercises to develop strength, endurance, ROM, flexibility, posture, and core stabilization for 25 minutes including:  UBE 3'/3' (fwd/bckwd)  Upper Trap Stretch 3 x 30'  Levator Scap Stretch 3 x 30'  Shoulder Rows 3 x 10  Doorway Stretch 3 x 10  Wall Slides 3 x 10  CCW Wall Slides 3 x 10    manual therapy techniques for 20 minutes, including:  Grd IV Joint Mobs to C7-T1  FDN and STM to L Upper Trap    Patient Education and Home Exercises       Education provided:   - HEP    Written Home Exercises Provided: Patient instructed to cont prior HEP. Exercises were reviewed and Neci was able to demonstrate them prior to the end of the session.  Neci demonstrated good  understanding of the education  provided. See Electronic Medical Record under Patient Instructions for exercises provided during therapy sessions    Assessment     Patient tolerated treatment session well and had no complaints of pain with therex or manual therapy. Pt had decreased pain and improved cervical ROM after manual therapy. Pt continues to show tightness in pec minor regarding her shoulder mobility. Cont to progress POC as tolerated.    Neci Is progressing well towards her goals.   Patient prognosis is Excellent.     Patient will continue to benefit from skilled outpatient physical therapy to address the deficits listed in the problem list box on initial evaluation, provide pt/family education and to maximize pt's level of independence in the home and community environment.     Patient's spiritual, cultural and educational needs considered and pt agreeable to plan of care and goals.     Anticipated barriers to physical therapy: None    Goals:   Goals:  Short Term Goals (4 Weeks):   1. Pt will be independent with HEP to supplement PT in improving pain free cervical mobility  2. Pt will improve cervical AROM 10-15%  in all planes to improve cervical mobility for driving  3. Pt will improve UE MMTs by 1/2 grade in all planes to improve strength for lifting and carrying tasks.  4. Pt will demonstrate improved sitting posture to decrease pain experienced in head and neck.  Long Term Goals (8 Weeks):   1. Pt will improve FOTO to >/=40% to improve perceived limitation with changing and maintaining mobility.  2. Pt will improve cervical AROM to WNL in all planes to improve cervical mobility for driving   3. Pt will improve UE MMTs 1 grade in all planes to improve strength for lifting and carrying tasks.  4. Pt will report no pain with lifting >5-10 lbs to promote physical activity.   5. Pt will report no pain with cervical AROM in all planes to promote QOL    Plan     Plan of care Certification: 11/2/2023 to 1/2/2023.     Outpatient Physical  Therapy 1-2 times weekly for 8 weeks to include the following interventions: Aquatic Therapy, Cervical/Lumbar Traction, Gait Training, Manual Therapy, Moist Heat/ Ice, Neuromuscular Re-ed, Patient Education, Self Care, Therapeutic Activities, Therapeutic Exercise, and Ultrasound.      Chris Garcia, PT          Chris Garcia, PT

## 2023-11-21 NOTE — TELEPHONE ENCOUNTER
Contacted by patient's PT who requested additional sessions for PT. Patient is making progress. Only requested 6 in initial session. Will request 9 more.

## 2023-11-21 NOTE — PROGRESS NOTES
[facility]: Occupational Therapy Note     See plan of care for full initial OT evaluation.    Kim Zayas, EDITH, OTR/L

## 2023-11-22 ENCOUNTER — CLINICAL SUPPORT (OUTPATIENT)
Dept: REHABILITATION | Facility: HOSPITAL | Age: 58
End: 2023-11-22
Payer: COMMERCIAL

## 2023-11-22 DIAGNOSIS — S46.812D STRAIN OF LEFT TRAPEZIUS MUSCLE, SUBSEQUENT ENCOUNTER: Primary | ICD-10-CM

## 2023-11-22 PROCEDURE — 97140 MANUAL THERAPY 1/> REGIONS: CPT

## 2023-11-22 PROCEDURE — 97110 THERAPEUTIC EXERCISES: CPT
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
## 2023-11-28 NOTE — PROGRESS NOTES
[facility]  [facility]   Physical Therapy Treatment Note      Name: Erasmo Dickens  [facility] Number: [id number]    Therapy Diagnosis:   Encounter Diagnosis   Name Primary?    Strain of left trapezius muscle, subsequent encounter Yes           Physician: Karissa Keating MD    Visit Date: 11/20/2023    Physician Orders: PT Eval and Treat  Medical Diagnosis from Referral: Strain of left trapezius muscle, subsequent encounter [S46.812D]   Evaluation Date: 11/2/2023  Authorization Period Expiration: 10/24/2023  Plan of Care Expiration: 1/2/2023  Progress Note Due: 12/2/2023  Date of Surgery: N/a  Visit # / Visits authorized: 3/ 6   FOTO: 1/ 3    PTA Visit #: 0/5       Subjective     Patient reports: her neck and shoulder continue to see minor progress. States that her L trap and shoulder continue to feel tight. States that she has a streak of good days and then she'll lift something and it will flare up  She was compliant with home exercise program.  Response to previous treatment: Initial Eval  Functional change: None    Pain: 3/10  Location: left trapezius    Objective      Objective Measures updated at progress report unless specified.     Treatment     Neci received the treatments listed below:    therapeutic exercises to develop strength, endurance, ROM, flexibility, posture, and core stabilization for 25 minutes including:  UBE 3'/3' (fwd/bckwd)  Upper Trap Stretch 3 x 30'  Levator Scap Stretch 3 x 30'  Shoulder Rows 3 x 10  Doorway Stretch 3 x 10  Wall Slides 3 x 10  CCW Wall Slides 3 x 10  CTJ Self Mob on Foam Roll 3 x 10    manual therapy techniques for 20 minutes, including:  Grd IV Joint Mobs to C7-T1  FDN and STM to L Upper Trap    Patient Education and Home Exercises       Education provided:   - HEP    Written Home Exercises Provided: Patient instructed to cont prior HEP. Exercises were reviewed and Neci was able to demonstrate them prior to the end of the session.  Neci demonstrated good   understanding of the education provided. See Electronic Medical Record under Patient Instructions for exercises provided during therapy sessions    Assessment     Patient tolerated treatment session well and had no complaints of pain with therex or manual therapy. Pt had decreased pain and improved cervical ROM after manual therapy. Pt continues to show tightness in pec minor regarding her shoulder mobility. Cont to progress POC as tolerated.    Neci Is progressing well towards her goals.   Patient prognosis is Excellent.     Patient will continue to benefit from skilled outpatient physical therapy to address the deficits listed in the problem list box on initial evaluation, provide pt/family education and to maximize pt's level of independence in the home and community environment.     Patient's spiritual, cultural and educational needs considered and pt agreeable to plan of care and goals.     Anticipated barriers to physical therapy: None    Goals:   Goals:  Short Term Goals (4 Weeks):   1. Pt will be independent with HEP to supplement PT in improving pain free cervical mobility  2. Pt will improve cervical AROM 10-15%  in all planes to improve cervical mobility for driving  3. Pt will improve UE MMTs by 1/2 grade in all planes to improve strength for lifting and carrying tasks.  4. Pt will demonstrate improved sitting posture to decrease pain experienced in head and neck.  Long Term Goals (8 Weeks):   1. Pt will improve FOTO to >/=40% to improve perceived limitation with changing and maintaining mobility.  2. Pt will improve cervical AROM to WNL in all planes to improve cervical mobility for driving   3. Pt will improve UE MMTs 1 grade in all planes to improve strength for lifting and carrying tasks.  4. Pt will report no pain with lifting >5-10 lbs to promote physical activity.   5. Pt will report no pain with cervical AROM in all planes to promote QOL    Plan     Plan of care Certification: 11/2/2023 to  1/2/2023.     Outpatient Physical Therapy 1-2 times weekly for 8 weeks to include the following interventions: Aquatic Therapy, Cervical/Lumbar Traction, Gait Training, Manual Therapy, Moist Heat/ Ice, Neuromuscular Re-ed, Patient Education, Self Care, Therapeutic Activities, Therapeutic Exercise, and Ultrasound.      Chris Garcia, PT          Chris Garcia, PT

## 2023-11-29 ENCOUNTER — OFFICE VISIT (OUTPATIENT)
Dept: URGENT CARE | Facility: CLINIC | Age: 58
End: 2023-11-29
Payer: COMMERCIAL

## 2023-11-29 ENCOUNTER — CLINICAL SUPPORT (OUTPATIENT)
Dept: REHABILITATION | Facility: HOSPITAL | Age: 58
End: 2023-11-29
Payer: COMMERCIAL

## 2023-11-29 VITALS
WEIGHT: 178 LBS | HEART RATE: 63 BPM | OXYGEN SATURATION: 97 % | RESPIRATION RATE: 17 BRPM | SYSTOLIC BLOOD PRESSURE: 140 MMHG | DIASTOLIC BLOOD PRESSURE: 85 MMHG | TEMPERATURE: 98 F | BODY MASS INDEX: 34.95 KG/M2 | HEIGHT: 60 IN

## 2023-11-29 DIAGNOSIS — S46.812D STRAIN OF LEFT TRAPEZIUS MUSCLE, SUBSEQUENT ENCOUNTER: Primary | ICD-10-CM

## 2023-11-29 DIAGNOSIS — M25.542 PAIN IN THUMB JOINT WITH MOVEMENT, LEFT: Primary | ICD-10-CM

## 2023-11-29 DIAGNOSIS — S66.412A STRAIN OF INTRINSIC MUSCLE OF LEFT THUMB: ICD-10-CM

## 2023-11-29 PROCEDURE — 97022 WHIRLPOOL THERAPY: CPT

## 2023-11-29 PROCEDURE — 97530 THERAPEUTIC ACTIVITIES: CPT

## 2023-11-29 PROCEDURE — 99213 PR OFFICE/OUTPT VISIT, EST, LEVL III, 20-29 MIN: ICD-10-PCS | Mod: S$GLB,,,

## 2023-11-29 PROCEDURE — 97140 MANUAL THERAPY 1/> REGIONS: CPT

## 2023-11-29 PROCEDURE — 99213 OFFICE O/P EST LOW 20 MIN: CPT | Mod: S$GLB,,,

## 2023-11-29 PROCEDURE — 97110 THERAPEUTIC EXERCISES: CPT

## 2023-11-29 RX ORDER — MONTELUKAST SODIUM 10 MG/1
TABLET ORAL
COMMUNITY
Start: 2023-10-26

## 2023-11-29 NOTE — LETTER
[facility] - Occupational Health  [address] [address]  Phone: [phone]  Fax: [phone]  [facility] [employer]: [email or web address]    Pt Name: Erasmo Lopez Date: 09/11/2023   Employee ID: [id number] Date of Treatment: 11/29/2023   Company: [employer]      Appointment Time: 1:15pm Arrived: 1:02pm   Provider: Tommie Neri, DO Time Out: 2:31pm     Office Treatment:   1. Strain of left trapezius muscle, subsequent encounter    2. Strain of intrinsic muscle of left thumb          Patient Instructions: Continue Physical Therapy, Daily home exercises/warm soaks      Restrictions: Regular Duty     Return Appointment: 12/20/2023 at 10:30am.  EC
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
## 2023-11-30 NOTE — PROGRESS NOTES
[facility] OUTPATIENT THERAPY AND WELLNESS   Physical Therapy Treatment Note      Name: Erasmo Dickens  [facility] Number: [id number]    Therapy Diagnosis:   No diagnosis found.          Physician: Karissa Keating MD    Visit Date: 11/22/2023    Physician Orders: PT Eval and Treat  Medical Diagnosis from Referral: Strain of left trapezius muscle, subsequent encounter [S46.812D]   Evaluation Date: 11/2/2023  Authorization Period Expiration: 10/24/2023  Plan of Care Expiration: 1/2/2023  Progress Note Due: 12/2/2023  Date of Surgery: N/a  Visit # / Visits authorized: 3/ 6   FOTO: 1/ 3    PTA Visit #: 0/5       Subjective     Patient reports: her neck and shoulder continue to see minor progress. States that her L trap and shoulder continue to feel tight. States that she has a streak of good days and then she'll lift something and it will flare up. Pt states sinus could be attributing to headache.  She was compliant with home exercise program.  Response to previous treatment: Initial Eval  Functional change: None    Pain: 3/10  Location: left trapezius    Objective      Objective Measures updated at progress report unless specified.     Treatment     Neci received the treatments listed below:    therapeutic exercises to develop strength, endurance, ROM, flexibility, posture, and core stabilization for 25 minutes including:  UBE 3'/3' (fwd/bckwd)  Upper Trap Stretch 3 x 30'  Levator Scap Stretch 3 x 30'  Shoulder Rows 3 x 10  Doorway Stretch 3 x 10  Wall Slides 3 x 10  CCW Wall Slides 3 x 10  CTJ Self Mob on Foam Roll 3 x 10    manual therapy techniques for 20 minutes, including:  Grd IV Joint Mobs to C7-T1  FDN and STM to L Upper Trap    Patient Education and Home Exercises       Education provided:   - HEP    Written Home Exercises Provided: Patient instructed to cont prior HEP. Exercises were reviewed and Neci was able to demonstrate them prior to the end of the session.  Neci demonstrated good  understanding of the  education provided. See Electronic Medical Record under Patient Instructions for exercises provided during therapy sessions    Assessment     Patient tolerated treatment session well and had no complaints of pain with therex or manual therapy. Pt had decreased pain and improved cervical ROM after manual therapy. Pt continues to show tightness in pec minor regarding her shoulder mobility. Cont to progress POC as tolerated.    Neci Is progressing well towards her goals.   Patient prognosis is Excellent.     Patient will continue to benefit from skilled outpatient physical therapy to address the deficits listed in the problem list box on initial evaluation, provide pt/family education and to maximize pt's level of independence in the home and community environment.     Patient's spiritual, cultural and educational needs considered and pt agreeable to plan of care and goals.     Anticipated barriers to physical therapy: None    Goals:   Goals:  Short Term Goals (4 Weeks):   1. Pt will be independent with HEP to supplement PT in improving pain free cervical mobility  2. Pt will improve cervical AROM 10-15%  in all planes to improve cervical mobility for driving  3. Pt will improve UE MMTs by 1/2 grade in all planes to improve strength for lifting and carrying tasks.  4. Pt will demonstrate improved sitting posture to decrease pain experienced in head and neck.  Long Term Goals (8 Weeks):   1. Pt will improve FOTO to >/=40% to improve perceived limitation with changing and maintaining mobility.  2. Pt will improve cervical AROM to WNL in all planes to improve cervical mobility for driving   3. Pt will improve UE MMTs 1 grade in all planes to improve strength for lifting and carrying tasks.  4. Pt will report no pain with lifting >5-10 lbs to promote physical activity.   5. Pt will report no pain with cervical AROM in all planes to promote QOL    Plan     Plan of care Certification: 11/2/2023 to 1/2/2023.     Outpatient  Physical Therapy 1-2 times weekly for 8 weeks to include the following interventions: Aquatic Therapy, Cervical/Lumbar Traction, Gait Training, Manual Therapy, Moist Heat/ Ice, Neuromuscular Re-ed, Patient Education, Self Care, Therapeutic Activities, Therapeutic Exercise, and Ultrasound.      Chris Garcia, PT          Chris Garcia, PT

## 2023-12-06 ENCOUNTER — CLINICAL SUPPORT (OUTPATIENT)
Dept: REHABILITATION | Facility: HOSPITAL | Age: 58
End: 2023-12-06
Payer: COMMERCIAL

## 2023-12-06 DIAGNOSIS — M25.542 PAIN IN THUMB JOINT WITH MOVEMENT, LEFT: Primary | ICD-10-CM

## 2023-12-06 DIAGNOSIS — S46.812D STRAIN OF LEFT TRAPEZIUS MUSCLE, SUBSEQUENT ENCOUNTER: Primary | ICD-10-CM

## 2023-12-06 PROCEDURE — 97110 THERAPEUTIC EXERCISES: CPT

## 2023-12-06 PROCEDURE — 97140 MANUAL THERAPY 1/> REGIONS: CPT

## 2023-12-06 PROCEDURE — 97022 WHIRLPOOL THERAPY: CPT

## 2023-12-06 PROCEDURE — 97530 THERAPEUTIC ACTIVITIES: CPT
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
## 2023-12-07 NOTE — PROGRESS NOTES
[place]  [facility]   Physical Therapy Treatment Note      Name: Erasmo Dickens  [facility] Number: [id number]    Therapy Diagnosis:   No diagnosis found.          Physician: Karissa Keating MD    Visit Date: 11/29/2023    Physician Orders: PT Eval and Treat  Medical Diagnosis from Referral: Strain of left trapezius muscle, subsequent encounter [S46.812D]   Evaluation Date: 11/2/2023  Authorization Period Expiration: 10/24/2023  Plan of Care Expiration: 1/2/2023  Progress Note Due: 12/2/2023  Date of Surgery: N/a  Visit # / Visits authorized: 3/ 6   FOTO: 1/ 3    PTA Visit #: 0/5       Subjective     Patient reports: her neck and shoulder feel great today  She was compliant with home exercise program.  Response to previous treatment: Initial Eval  Functional change: None    Pain: 1/10  Location: left trapezius    Objective      Objective Measures updated at progress report unless specified.     Treatment     Neci received the treatments listed below:    therapeutic exercises to develop strength, endurance, ROM, flexibility, posture, and core stabilization for 25 minutes including:  UBE 3'/3' (fwd/bckwd)  Upper Trap Stretch 3 x 30'  Levator Scap Stretch 3 x 30'  Shoulder Rows 3 x 10  Doorway Stretch 3 x 10  Wall Slides 3 x 10  CCW Wall Slides 3 x 10  CTJ Self Mob on Foam Roll 3 x 10    manual therapy techniques for 20 minutes, including:  Grd IV Joint Mobs to C7-T1  FDN and STM to L Upper Trap    Patient Education and Home Exercises       Education provided:   - HEP    Written Home Exercises Provided: Patient instructed to cont prior HEP. Exercises were reviewed and Michellei was able to demonstrate them prior to the end of the session.  Neci demonstrated good  understanding of the education provided. See Electronic Medical Record under Patient Instructions for exercises provided during therapy sessions    Assessment     Patient tolerated treatment session well and had no complaints of pain with therex or  manual therapy. Pt had decreased pain and improved cervical ROM after manual therapy. Pt continues to show tightness in pec minor regarding her shoulder mobility. Cont to progress POC as tolerated.    Neci Is progressing well towards her goals.   Patient prognosis is Excellent.     Patient will continue to benefit from skilled outpatient physical therapy to address the deficits listed in the problem list box on initial evaluation, provide pt/family education and to maximize pt's level of independence in the home and community environment.     Patient's spiritual, cultural and educational needs considered and pt agreeable to plan of care and goals.     Anticipated barriers to physical therapy: None    Goals:   Goals:  Short Term Goals (4 Weeks):   1. Pt will be independent with HEP to supplement PT in improving pain free cervical mobility  2. Pt will improve cervical AROM 10-15%  in all planes to improve cervical mobility for driving  3. Pt will improve UE MMTs by 1/2 grade in all planes to improve strength for lifting and carrying tasks.  4. Pt will demonstrate improved sitting posture to decrease pain experienced in head and neck.  Long Term Goals (8 Weeks):   1. Pt will improve FOTO to >/=40% to improve perceived limitation with changing and maintaining mobility.  2. Pt will improve cervical AROM to WNL in all planes to improve cervical mobility for driving   3. Pt will improve UE MMTs 1 grade in all planes to improve strength for lifting and carrying tasks.  4. Pt will report no pain with lifting >5-10 lbs to promote physical activity.   5. Pt will report no pain with cervical AROM in all planes to promote QOL    Plan     Plan of care Certification: 11/2/2023 to 1/2/2023.     Outpatient Physical Therapy 1-2 times weekly for 8 weeks to include the following interventions: Aquatic Therapy, Cervical/Lumbar Traction, Gait Training, Manual Therapy, Moist Heat/ Ice, Neuromuscular Re-ed, Patient Education, Self Care,  Therapeutic Activities, Therapeutic Exercise, and Ultrasound.      Chris Garcia, PT          Chris Garcia, PT

## 2023-12-13 ENCOUNTER — CLINICAL SUPPORT (OUTPATIENT)
Dept: REHABILITATION | Facility: HOSPITAL | Age: 58
End: 2023-12-13
Payer: COMMERCIAL

## 2023-12-13 DIAGNOSIS — M25.542 PAIN IN THUMB JOINT WITH MOVEMENT, LEFT: Primary | ICD-10-CM

## 2023-12-13 DIAGNOSIS — S46.812D STRAIN OF LEFT TRAPEZIUS MUSCLE, SUBSEQUENT ENCOUNTER: Primary | ICD-10-CM

## 2023-12-13 PROCEDURE — 97140 MANUAL THERAPY 1/> REGIONS: CPT

## 2023-12-13 PROCEDURE — 97110 THERAPEUTIC EXERCISES: CPT

## 2023-12-13 PROCEDURE — 97018 PARAFFIN BATH THERAPY: CPT

## 2023-12-13 PROCEDURE — 97022 WHIRLPOOL THERAPY: CPT

## 2023-12-13 NOTE — PROGRESS NOTES
"[facility]  Occupational Therapy Treatment Note    Date: 12/13/2023  Name: Erasmo Dickens  Clinic Number: [id number]    Therapy Diagnosis:   Encounter Diagnosis   Name Primary?    Pain in thumb joint with movement, left Yes     Physician: Tommie Neri DO    Physician Orders: OT Eval and Treat  Medical Diagnosis: S66.412A (ICD-10-CM) - Strain of intrinsic muscle of left thumb   Surgical Procedure and Date: N/A / Date of Injury/Onset: November 2022 then worsened after work accident on 9/11/2023   Evaluation Date: 11/21/2023  Insurance Authorization Period Expiration: 11/08/2024   Plan of Care Expiration: 2/16/24 (12 weeks)   Date of Return to MD: 11/29/2023  Visit # / Visits authorized: 4 / 12  FOTO: 1/3    Precautions:  Standard    Time In: 9:34 am   Time Out: 10:15 am  Total Billable Time: 31 minutes    SUBJECTIVE     Pt reports: "Pain is zero."   She was compliant with home exercise program given last session.   Response to previous treatment: Good - decreased pain  Functional change: 13 point improvement in FOTO    Pain: 0/10  Location: left thumb    OBJECTIVE   Objective Measures updated at progress report unless specified.    Observation/Appearance: Normal appearance, skin warm and dry. B/L RFs with passively correctable swan-neck deformities with active flexion.     Edema. No significant edema noted.      Elbow and Wrist ROM. WFLs - all planes of motion.      Hand ROM. WFL - Pt able to make full composite fists with B/L hands.    11/21/2023 11/21/2023     Left Right           Thumb:              Opposition Touch to SF P1 WFL touch to SF DPC       Strength (Dynamometer) and Pinch Strength (Pinch Gauge)  Measured in pounds to POP.    12/13/2023 12/13/2023     Left Right   Rung II 28 38   Key Pinch 9  11   3pt Pinch 7  10   2pt Pinch 6  7.5      Sensation. Grossly intact. Pt denies numbness and/or tingling in LUE(s).     Special Tests  Thumb CMC Grind Test + L   Finkelstein's Test " "- L     CMS Impairment/Limitation/Restriction for FOTO Hand Survey    Therapist reviewed FOTO scores for Erasmo Dickens on 12/13/2023.   FOTO documents entered into EP[employer] - see Media section.    Limitation Score: 35% (13 points improved)        Treatment     Neci received the treatments listed below:     Supervised modalities after being cleared for contradictions:   Paraffin bath with moist heat pack to L hand(s) for 10 minutes to increase blood flow, circulation, pain management, and for tissue elasticity prior to therex.   Fluidotherapy - 120 degrees and 50 speed, to L hand/wrist for 8 minutes to increase blood flow and circulation, desensitization and sensory re-education, pain management, and increased tissue extensibility prior to therex. Pt instructed on performing active range of motion exercises while receiving heat to increase active motion.     Manual therapy techniques: Myofacial release, Soft tissue Mobilization, and Friction Massage were applied to the: L hand/wrist for 8 minutes, including:  - Performed soft tissue mobilization/massage to L thumb/hand to relieve associated soft tissue tightness, improve joint mobility, decrease pain, and improve lymphatic drainage to increase ROM.   - Webspace myofascial trigger point release and cupping     Therapeutic exercises to develop endurance, ROM, and flexibility for 23 minutes, including:  - Updated objective measurements, see above.   - AROM in fluidotherapy x 8 min: comp flex/ext, radial add/abd, palmar add/abd, opposition, circumduction (CW/CCW), pinky slides, MP retropulsion x 15+ reps each; Full fisting x 20 reps  - Isometric "C" x 10 reps with 3 sec holds  - Light gripping with yellow putty x 2 min @ home      Therapeutic activities to improve functional performance for 0 minutes, including:  NT:  - Small poms nesting 3 at a time x 20 reps   - Isospheres x 2 min  - Thumb ladder x 2 min     Patient Education and Home Exercises      Education provided:   - " HEP in place. Emphasized performing HEP per prescription for maximum benefit.   - Progress towards goals     Written Home Exercises Provided: Patient instructed to cont prior HEP.  Exercises were reviewed and May was able to demonstrate them prior to the end of the session.  May demonstrated good  understanding of the HEP provided. See EMR under Patient Instructions for exercises provided during therapy sessions.      ASSESSMENT     Pt participated well and endorses fair adherence to HEP. Thumb ROM demo improvements per observation. Reports no pain today and endorses wearing a brace as instructed. She demo 13 point improvement in FOTO. Baseline strength measurements taken today demonstrate min deficits in /pinch of affected, left hand. Will progress as tolerated.    May is progressing well towards her goals and there are no updates to goals at this time. Pt prognosis is Good.     Pt will continue to benefit from skilled outpatient occupational therapy to address the deficits listed in the problem list on initial evaluation, provide pt/family education and to maximize pt's level of independence in the home and community environment.     Pt's spiritual, cultural and educational needs considered and pt agreeable to plan of care and goals.    Anticipated barriers to occupational therapy: none identified at this time     Goals:   The following goals were discussed with the patient and patient is in agreement with them as to be addressed in the treatment plan.   Long Term Goals (LTGs); to be met by discharge.  LTG #1: Pt will report a pain level of 1-3 out of 10 at worst with daily hand use. progressing not met 12/13/2023  LTG #2: Pt will demo improved FOTO score by 10-15 points. Met 12/13/2023  LTG #3: Pt will return to prior level of function for IADLs and household management. progressing not met 12/13/2023     Short Term Goals (STGs); to be met within 4 weeks (12/22/23).  STG #1: Pt will report a pain level of  4-5 out of 10 at worst with daily hand use. Met 12/13/2023  STG #2: Pt will report/demo [place] with ADLs. Met 12/13/2023  STG #3: Pt will demonstrate independence with issued HEP, brace wear as needed, and modalities for pain/symptom management. progressing not met 12/13/2023  STG #4: Assess /pinch strength when appropriate and update goals as needed with focus on return to regular work tasks. Met 12/13/2023    PLAN     Continue skilled occupational therapy with individualized plan of care focusing on maximizing functional use of patient's left hand.    Updates/Grading for next session: progress as tolerated.    Kim Zayas, OTR/L

## 2023-12-18 NOTE — PROGRESS NOTES
[place]  [facility]   Physical Therapy Treatment Note      Name: Erasmo Dickens  [facility] Number: [id number]    Therapy Diagnosis:   No diagnosis found.          Physician: Karissa Keating MD    Visit Date: 12/6/2023    Physician Orders: PT Eval and Treat  Medical Diagnosis from Referral: Strain of left trapezius muscle, subsequent encounter [S46.812D]   Evaluation Date: 11/2/2023  Authorization Period Expiration: 10/24/2023  Plan of Care Expiration: 1/2/2023  Progress Note Due: 12/2/2023  Date of Surgery: N/a  Visit # / Visits authorized: 3/ 6   FOTO: 1/ 3    PTA Visit #: 0/5       Subjective     Patient reports: her neck and shoulder feel great today  She was compliant with home exercise program.  Response to previous treatment: Initial Eval  Functional change: None    Pain: 1/10  Location: left trapezius    Objective      Objective Measures updated at progress report unless specified.     Treatment     Neci received the treatments listed below:    therapeutic exercises to develop strength, endurance, ROM, flexibility, posture, and core stabilization for 25 minutes including:  UBE 3'/3' (fwd/bckwd)  Upper Trap Stretch 3 x 30'  Levator Scap Stretch 3 x 30'  Shoulder Rows 3 x 10  Doorway Stretch 3 x 10  Wall Slides 3 x 10  CCW Wall Slides 3 x 10  CTJ Self Mob on Foam Roll 3 x 10    manual therapy techniques for 20 minutes, including:  Grd IV Joint Mobs to C7-T1  FDN and STM to L Upper Trap    Patient Education and Home Exercises       Education provided:   - HEP    Written Home Exercises Provided: Patient instructed to cont prior HEP. Exercises were reviewed and Michellei was able to demonstrate them prior to the end of the session.  Neci demonstrated good  understanding of the education provided. See Electronic Medical Record under Patient Instructions for exercises provided during therapy sessions    Assessment     Patient tolerated treatment session well and had no complaints of pain with therex or  manual therapy. Pt had decreased pain and improved cervical ROM after manual therapy. Pt continues to show tightness in pec minor regarding her shoulder mobility. Cont to progress POC as tolerated.    Neci Is progressing well towards her goals.   Patient prognosis is Excellent.     Patient will continue to benefit from skilled outpatient physical therapy to address the deficits listed in the problem list box on initial evaluation, provide pt/family education and to maximize pt's level of independence in the home and community environment.     Patient's spiritual, cultural and educational needs considered and pt agreeable to plan of care and goals.     Anticipated barriers to physical therapy: None    Goals:   Goals:  Short Term Goals (4 Weeks):   1. Pt will be independent with HEP to supplement PT in improving pain free cervical mobility  2. Pt will improve cervical AROM 10-15%  in all planes to improve cervical mobility for driving  3. Pt will improve UE MMTs by 1/2 grade in all planes to improve strength for lifting and carrying tasks.  4. Pt will demonstrate improved sitting posture to decrease pain experienced in head and neck.  Long Term Goals (8 Weeks):   1. Pt will improve FOTO to >/=40% to improve perceived limitation with changing and maintaining mobility.  2. Pt will improve cervical AROM to WNL in all planes to improve cervical mobility for driving   3. Pt will improve UE MMTs 1 grade in all planes to improve strength for lifting and carrying tasks.  4. Pt will report no pain with lifting >5-10 lbs to promote physical activity.   5. Pt will report no pain with cervical AROM in all planes to promote QOL    Plan     Plan of care Certification: 11/2/2023 to 1/2/2023.     Outpatient Physical Therapy 1-2 times weekly for 8 weeks to include the following interventions: Aquatic Therapy, Cervical/Lumbar Traction, Gait Training, Manual Therapy, Moist Heat/ Ice, Neuromuscular Re-ed, Patient Education, Self Care,  Therapeutic Activities, Therapeutic Exercise, and Ultrasound.      Chris Garcia, PT          Chris Garcia, PT

## 2023-12-20 ENCOUNTER — CLINICAL SUPPORT (OUTPATIENT)
Dept: REHABILITATION | Facility: HOSPITAL | Age: 58
End: 2023-12-20
Payer: COMMERCIAL

## 2023-12-20 DIAGNOSIS — M25.542 PAIN IN THUMB JOINT WITH MOVEMENT, LEFT: Primary | ICD-10-CM

## 2023-12-20 PROCEDURE — 97140 MANUAL THERAPY 1/> REGIONS: CPT

## 2023-12-20 PROCEDURE — 97018 PARAFFIN BATH THERAPY: CPT

## 2023-12-20 PROCEDURE — 97110 THERAPEUTIC EXERCISES: CPT

## 2023-12-20 PROCEDURE — 97022 WHIRLPOOL THERAPY: CPT

## 2023-12-20 NOTE — PROGRESS NOTES
"[facility]  Occupational Therapy Treatment Note    Date: 12/20/2023  Name: Erasmo Dickens  Clinic Number: [id number]    Therapy Diagnosis:   Encounter Diagnosis   Name Primary?    Pain in thumb joint with movement, left Yes     Physician: Tommie Neri DO    Physician Orders: OT Eval and Treat  Medical Diagnosis: S66.412A (ICD-10-CM) - Strain of intrinsic muscle of left thumb   Surgical Procedure and Date: N/A / Date of Injury/Onset: November 2022 then worsened after work accident on 9/11/2023   Evaluation Date: 11/21/2023  Insurance Authorization Period Expiration: 11/08/2024   Plan of Care Expiration: 2/16/24 (12 weeks)   Date of Return to MD: 11/29/2023  Visit # / Visits authorized: 5 / 12  FOTO: 2/3    Precautions:  Standard    Time In: 9:40 am   Time Out: 10:17 am  Total Billable Time: 25 minutes    SUBJECTIVE     Pt reports: "Pain is zero."   She was compliant with home exercise program given last session.   Response to previous treatment: Good - decreased pain  Functional change: 13 point improvement in FOTO    Pain: 0/10  Location: left thumb    OBJECTIVE   Objective Measures updated at progress report unless specified.    Observation/Appearance: Normal appearance, skin warm and dry. B/L RFs with passively correctable swan-neck deformities with active flexion.     Edema. No significant edema noted.      Elbow and Wrist ROM. WFLs - all planes of motion.      Hand ROM. WFL - Pt able to make full composite fists with B/L hands.    11/21/2023 11/21/2023     Left Right           Thumb:              Opposition Touch to SF P1 WFL touch to SF DPC       Strength (Dynamometer) and Pinch Strength (Pinch Gauge)  Measured in pounds to POP.    12/13/2023 12/13/2023     Left Right   Rung II 28 38   Key Pinch 9  11   3pt Pinch 7  10   2pt Pinch 6  7.5      Sensation. Grossly intact. Pt denies numbness and/or tingling in LUE(s).     Special Tests  Thumb CMC Grind Test + L   Finkelstein's Test " "- L     Treatment     Neci received the treatments listed below:     Supervised modalities after being cleared for contradictions:   Paraffin bath with moist heat pack to L hand(s) for 12 minutes to increase blood flow, circulation, pain management, and for tissue elasticity prior to therex.   Fluidotherapy - 120 degrees and 50 speed, to L hand/wrist for 8 minutes to increase blood flow and circulation, desensitization and sensory re-education, pain management, and increased tissue extensibility during therex. Pt instructed on performing active range of motion exercises while receiving heat to increase active motion.     Manual therapy techniques: Myofacial release, Soft tissue Mobilization, and Friction Massage were applied to the: L hand/wrist for 8 minutes, including:  - Performed soft tissue mobilization/massage to L thumb/hand to relieve associated soft tissue tightness, improve joint mobility, decrease pain, and improve lymphatic drainage to increase ROM.   - Webspace myofascial trigger point release and cupping     Therapeutic exercises to develop endurance, ROM, and flexibility for 17 minutes, including:  - AROM in fluidotherapy x 8 min: comp flex/ext, radial add/abd, palmar add/abd, opposition, circumduction (CW/CCW), pinky slides, MP retropulsion x 15+ reps each; Full fisting x 20 reps, yellow sponge gripping   - Isometric "C" x 10 reps with 5 sec holds   - Isometric  with tennis ball x 10 reps with 3 sec holds   - Light gripping with yellow putty x 2 min @ home      Therapeutic activities to improve functional performance for 0 minutes, including:  NT:  - Small poms nesting 3 at a time x 20 reps   - Isospheres x 2 min  - Thumb ladder x 2 min     Patient Education and Home Exercises      Education provided:   - HEP in place. Emphasized performing HEP per prescription for maximum benefit.   - Progress towards goals     Written Home Exercises Provided: Patient instructed to cont prior HEP.  Exercises " were reviewed and May was able to demonstrate them prior to the end of the session.  Michellei demonstrated good  understanding of the HEP provided. See EMR under Patient Instructions for exercises provided during therapy sessions.      ASSESSMENT     Pt participated well and endorses fair adherence to HEP. Thumb ROM demo improvements per observation. Reports no pain today and endorses wearing a brace as instructed. Continued light strengthening with min issue. Will progress as tolerated.     May is progressing well towards her goals and there are no updates to goals at this time. Pt prognosis is Good.     Pt will continue to benefit from skilled outpatient occupational therapy to address the deficits listed in the problem list on initial evaluation, provide pt/family education and to maximize pt's level of independence in the home and community environment.     Pt's spiritual, cultural and educational needs considered and pt agreeable to plan of care and goals.    Anticipated barriers to occupational therapy: none identified at this time     Goals:   The following goals were discussed with the patient and patient is in agreement with them as to be addressed in the treatment plan.   Long Term Goals (LTGs); to be met by discharge.  LTG #1: Pt will report a pain level of 1-3 out of 10 at worst with daily hand use. progressing not met 12/20/2023  LTG #2: Pt will demo improved FOTO score by 10-15 points. Met 12/13/2023  LTG #3: Pt will return to prior level of function for IADLs and household management. progressing not met 12/20/2023     Short Term Goals (STGs); to be met within 4 weeks (12/22/23).  STG #1: Pt will report a pain level of 4-5 out of 10 at worst with daily hand use. Met 12/13/2023  STG #2: Pt will report/demo [place] with ADLs. Met 12/13/2023  STG #3: Pt will demonstrate independence with issued HEP, brace wear as needed, and modalities for pain/symptom management. progressing not met 12/20/2023  STG  #4: Assess /pinch strength when appropriate and update goals as needed with focus on return to regular work tasks. Met 12/13/2023    PLAN     Continue skilled occupational therapy with individualized plan of care focusing on maximizing functional use of patient's left hand.    Updates/Grading for next session: progress as tolerated.    Kim Zayas, OTR/L

## 2023-12-27 ENCOUNTER — CLINICAL SUPPORT (OUTPATIENT)
Dept: REHABILITATION | Facility: HOSPITAL | Age: 58
End: 2023-12-27
Payer: COMMERCIAL

## 2023-12-27 DIAGNOSIS — S46.812D STRAIN OF LEFT TRAPEZIUS MUSCLE, SUBSEQUENT ENCOUNTER: Primary | ICD-10-CM

## 2023-12-27 DIAGNOSIS — M25.542 PAIN IN THUMB JOINT WITH MOVEMENT, LEFT: Primary | ICD-10-CM

## 2023-12-27 PROCEDURE — 97140 MANUAL THERAPY 1/> REGIONS: CPT

## 2023-12-27 PROCEDURE — 97022 WHIRLPOOL THERAPY: CPT

## 2023-12-27 PROCEDURE — 97110 THERAPEUTIC EXERCISES: CPT

## 2023-12-27 PROCEDURE — 97530 THERAPEUTIC ACTIVITIES: CPT

## 2023-12-27 PROCEDURE — 97018 PARAFFIN BATH THERAPY: CPT
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
## 2023-12-27 NOTE — PROGRESS NOTES
[place]  [facility]   Physical Therapy Treatment Note      Name: Erasmo Dickens  [facility] Number: [id number]    Therapy Diagnosis:   No diagnosis found.          Physician: Karissa Keating MD    Visit Date: 12/27/2023    Physician Orders: PT Eval and Treat  Medical Diagnosis from Referral: Strain of left trapezius muscle, subsequent encounter [S46.812D]   Evaluation Date: 11/2/2023  Authorization Period Expiration: 10/24/2023  Plan of Care Expiration: 1/2/2023  Progress Note Due: 12/2/2023  Date of Surgery: N/a  Visit # / Visits authorized: 3/ 6   FOTO: 1/ 3    PTA Visit #: 0/5       Subjective     Patient reports: her neck and shoulder feel great today  She was compliant with home exercise program.  Response to previous treatment: Initial Eval  Functional change: None    Pain: 1/10  Location: left trapezius    Objective      Objective Measures updated at progress report unless specified.     Treatment     Neci received the treatments listed below:    therapeutic exercises to develop strength, endurance, ROM, flexibility, posture, and core stabilization for 25 minutes including:  UBE 3'/3' (fwd/bckwd)  Upper Trap Stretch 3 x 30'  Levator Scap Stretch 3 x 30'  Shoulder Rows 3 x 10  Doorway Stretch 3 x 10  Wall Slides 3 x 10  CCW Wall Slides 3 x 10  CTJ Self Mob on Foam Roll 3 x 10    manual therapy techniques for 20 minutes, including:  Grd IV Joint Mobs to C7-T1  FDN and STM to L Upper Trap    Patient Education and Home Exercises       Education provided:   - HEP    Written Home Exercises Provided: Patient instructed to cont prior HEP. Exercises were reviewed and Michellei was able to demonstrate them prior to the end of the session.  Neci demonstrated good  understanding of the education provided. See Electronic Medical Record under Patient Instructions for exercises provided during therapy sessions    Assessment     Patient tolerated treatment session well and had no complaints of pain with therex or  manual therapy. Pt had decreased pain and improved cervical ROM after manual therapy. Pt continues to show tightness in pec minor regarding her shoulder mobility. Cont to progress POC as tolerated.    Neci Is progressing well towards her goals.   Patient prognosis is Excellent.     Patient will continue to benefit from skilled outpatient physical therapy to address the deficits listed in the problem list box on initial evaluation, provide pt/family education and to maximize pt's level of independence in the home and community environment.     Patient's spiritual, cultural and educational needs considered and pt agreeable to plan of care and goals.     Anticipated barriers to physical therapy: None    Goals:   Goals:  Short Term Goals (4 Weeks):   1. Pt will be independent with HEP to supplement PT in improving pain free cervical mobility  2. Pt will improve cervical AROM 10-15%  in all planes to improve cervical mobility for driving  3. Pt will improve UE MMTs by 1/2 grade in all planes to improve strength for lifting and carrying tasks.  4. Pt will demonstrate improved sitting posture to decrease pain experienced in head and neck.  Long Term Goals (8 Weeks):   1. Pt will improve FOTO to >/=40% to improve perceived limitation with changing and maintaining mobility.  2. Pt will improve cervical AROM to WNL in all planes to improve cervical mobility for driving   3. Pt will improve UE MMTs 1 grade in all planes to improve strength for lifting and carrying tasks.  4. Pt will report no pain with lifting >5-10 lbs to promote physical activity.   5. Pt will report no pain with cervical AROM in all planes to promote QOL    Plan     Plan of care Certification: 11/2/2023 to 1/2/2023.     Outpatient Physical Therapy 1-2 times weekly for 8 weeks to include the following interventions: Aquatic Therapy, Cervical/Lumbar Traction, Gait Training, Manual Therapy, Moist Heat/ Ice, Neuromuscular Re-ed, Patient Education, Self Care,  Therapeutic Activities, Therapeutic Exercise, and Ultrasound.      Chris Garcia, PT          Chris Garcia, PT

## 2023-12-28 ENCOUNTER — OFFICE VISIT (OUTPATIENT)
Dept: URGENT CARE | Facility: CLINIC | Age: 58
End: 2023-12-28
Payer: COMMERCIAL

## 2023-12-28 VITALS
HEART RATE: 65 BPM | OXYGEN SATURATION: 97 % | DIASTOLIC BLOOD PRESSURE: 84 MMHG | HEIGHT: 60 IN | WEIGHT: 178 LBS | BODY MASS INDEX: 34.95 KG/M2 | TEMPERATURE: 98 F | SYSTOLIC BLOOD PRESSURE: 132 MMHG | RESPIRATION RATE: 18 BRPM

## 2023-12-28 DIAGNOSIS — S66.412A STRAIN OF INTRINSIC MUSCLE OF LEFT THUMB: ICD-10-CM

## 2023-12-28 DIAGNOSIS — S46.812D STRAIN OF LEFT TRAPEZIUS MUSCLE, SUBSEQUENT ENCOUNTER: Primary | ICD-10-CM

## 2023-12-28 PROCEDURE — 99214 PR OFFICE/OUTPT VISIT, EST, LEVL IV, 30-39 MIN: ICD-10-PCS | Mod: S$GLB,,,

## 2023-12-28 PROCEDURE — 99214 OFFICE O/P EST MOD 30 MIN: CPT | Mod: S$GLB,,,
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
## 2023-12-28 NOTE — LETTER
[facility]  [address] [address]  Phone: [phone]  Fax: [phone]  [facility] [facility]: [email or web address]    Pt Name: Erasmo Lopez Date: 09/11/2023   Employee ID: [id number] Date of Treatment: 12/28/2023   Company: [employer]      Appointment Time: 09:30 AM Arrived: 9:20 AM    Provider: Tommie Neri, DO Time Out:10:12 AM      Office Treatment:   1. Strain of left trapezius muscle, subsequent encounter    2. Strain of intrinsic muscle of left thumb          Patient Instructions: Daily home exercises/warm soaks, Continue Physical Therapy      Restrictions: Regular Duty     Return Appointment: 1/26/2024 at 9:30 AM

## 2024-01-03 ENCOUNTER — CLINICAL SUPPORT (OUTPATIENT)
Dept: REHABILITATION | Facility: HOSPITAL | Age: 59
End: 2024-01-03
Payer: COMMERCIAL

## 2024-01-03 DIAGNOSIS — S46.812D STRAIN OF LEFT TRAPEZIUS MUSCLE, SUBSEQUENT ENCOUNTER: Primary | ICD-10-CM

## 2024-01-03 PROCEDURE — 97140 MANUAL THERAPY 1/> REGIONS: CPT

## 2024-01-03 PROCEDURE — 97110 THERAPEUTIC EXERCISES: CPT

## 2024-01-04 ENCOUNTER — CLINICAL SUPPORT (OUTPATIENT)
Dept: REHABILITATION | Facility: HOSPITAL | Age: 59
End: 2024-01-04
Payer: COMMERCIAL

## 2024-01-04 DIAGNOSIS — M25.542 PAIN IN THUMB JOINT WITH MOVEMENT, LEFT: Primary | ICD-10-CM

## 2024-01-04 PROCEDURE — 97140 MANUAL THERAPY 1/> REGIONS: CPT

## 2024-01-04 PROCEDURE — 97018 PARAFFIN BATH THERAPY: CPT

## 2024-01-04 PROCEDURE — 97022 WHIRLPOOL THERAPY: CPT

## 2024-01-04 PROCEDURE — 97110 THERAPEUTIC EXERCISES: CPT
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
## 2024-01-21 NOTE — PROGRESS NOTES
[place]  [facility]   Physical Therapy Treatment Note      Name: Erasmo Dickens  [facility] Number: [id number]    Therapy Diagnosis:   No diagnosis found.          Physician: Karissa Keating MD    Visit Date: 1/3/2024    Physician Orders: PT Eval and Treat  Medical Diagnosis from Referral: Strain of left trapezius muscle, subsequent encounter [S46.812D]   Evaluation Date: 11/2/2023  Authorization Period Expiration: 10/24/2023  Plan of Care Expiration: 1/2/2023  Progress Note Due: 12/2/2023  Date of Surgery: N/a  Visit # / Visits authorized: 3/ 6, 5/9  FOTO: 1/ 3    PTA Visit #: 0/5       Subjective     Patient reports: her neck and shoulder feel great today  She was compliant with home exercise program.  Response to previous treatment: Initial Eval  Functional change: None    Pain: 1/10  Location: left trapezius    Objective      Objective Measures updated at progress report unless specified.     Treatment     Neci received the treatments listed below:    therapeutic exercises to develop strength, endurance, ROM, flexibility, posture, and core stabilization for 30 minutes including:  UBE 3'/3' (fwd/bckwd)  Upper Trap Stretch 3 x 30'  Levator Scap Stretch 3 x 30'  Shoulder Rows 3 x 10  Doorway Stretch 3 x 10  Wall Slides 3 x 10  CCW Wall Slides 3 x 10  CTJ Self Mob on Foam Roll 3 x 10    manual therapy techniques for 15 minutes, including:  Grd IV Joint Mobs to C7-T1  FDN and STM to L Upper Trap    Patient Education and Home Exercises       Education provided:   - HEP    Written Home Exercises Provided: Patient instructed to cont prior HEP. Exercises were reviewed and Neci was able to demonstrate them prior to the end of the session.  Neci demonstrated good  understanding of the education provided. See Electronic Medical Record under Patient Instructions for exercises provided during therapy sessions    Assessment     Patient tolerated treatment session well and had no complaints of pain with therex  or manual therapy. Pt had decreased pain and improved cervical ROM after manual therapy. Pt continues to show tightness in pec minor regarding her shoulder mobility. Cont to progress POC as tolerated.    Neci Is progressing well towards her goals.   Patient prognosis is Excellent.     Patient will continue to benefit from skilled outpatient physical therapy to address the deficits listed in the problem list box on initial evaluation, provide pt/family education and to maximize pt's level of independence in the home and community environment.     Patient's spiritual, cultural and educational needs considered and pt agreeable to plan of care and goals.     Anticipated barriers to physical therapy: None    Goals:   Goals:  Short Term Goals (4 Weeks):   1. Pt will be independent with HEP to supplement PT in improving pain free cervical mobility  2. Pt will improve cervical AROM 10-15%  in all planes to improve cervical mobility for driving  3. Pt will improve UE MMTs by 1/2 grade in all planes to improve strength for lifting and carrying tasks.  4. Pt will demonstrate improved sitting posture to decrease pain experienced in head and neck.  Long Term Goals (8 Weeks):   1. Pt will improve FOTO to >/=40% to improve perceived limitation with changing and maintaining mobility.  2. Pt will improve cervical AROM to WNL in all planes to improve cervical mobility for driving   3. Pt will improve UE MMTs 1 grade in all planes to improve strength for lifting and carrying tasks.  4. Pt will report no pain with lifting >5-10 lbs to promote physical activity.   5. Pt will report no pain with cervical AROM in all planes to promote QOL    Plan     Plan of care Certification: 11/2/2023 to 1/2/2023.     Outpatient Physical Therapy 1-2 times weekly for 8 weeks to include the following interventions: Aquatic Therapy, Cervical/Lumbar Traction, Gait Training, Manual Therapy, Moist Heat/ Ice, Neuromuscular Re-ed, Patient Education, Self Care,  Therapeutic Activities, Therapeutic Exercise, and Ultrasound.      Chris Garcia, PT          Chris Garcia, PT

## 2024-01-26 ENCOUNTER — OFFICE VISIT (OUTPATIENT)
Dept: URGENT CARE | Facility: CLINIC | Age: 59
End: 2024-01-26
Payer: COMMERCIAL

## 2024-01-26 VITALS
TEMPERATURE: 99 F | OXYGEN SATURATION: 99 % | HEART RATE: 70 BPM | DIASTOLIC BLOOD PRESSURE: 86 MMHG | HEIGHT: 60 IN | RESPIRATION RATE: 18 BRPM | WEIGHT: 177 LBS | BODY MASS INDEX: 34.75 KG/M2 | SYSTOLIC BLOOD PRESSURE: 140 MMHG

## 2024-01-26 DIAGNOSIS — Z02.6 ENCOUNTER RELATED TO WORKER'S COMPENSATION CLAIM: ICD-10-CM

## 2024-01-26 DIAGNOSIS — S66.412A STRAIN OF INTRINSIC MUSCLE OF LEFT THUMB: ICD-10-CM

## 2024-01-26 DIAGNOSIS — S46.812D STRAIN OF LEFT TRAPEZIUS MUSCLE, SUBSEQUENT ENCOUNTER: Primary | ICD-10-CM

## 2024-01-26 PROCEDURE — 99214 OFFICE O/P EST MOD 30 MIN: CPT | Mod: S$GLB,,,

## 2024-01-26 NOTE — LETTER
[facility]  [address] [facility] [address]  Phone: [phone]  Fax: [phone]  [facility]: [email or web address]    Pt Name: Erasmo Lopez Date: 09/11/2023   Employee ID: [id number] Date of Treatment: 01/26/2024   Company: [employer]      Appointment Time: 09:15 AM Arrived: 9:27 AM   Provider: Tommie Neri, DO Time Out:10:35 AM     Office Treatment:   1. Strain of left trapezius muscle, subsequent encounter    2. Encounter related to worker's compensation claim    3. Strain of intrinsic muscle of left thumb               Restrictions: Regular Duty, Discharged from Occupational Health     Return Appointment: if symptoms worsen or fail to improve

## 2024-01-26 NOTE — PROGRESS NOTES
Subjective:      Patient ID: Erasmo Dickens is a 58 y.o. female.    Chief Complaint: Neck Injury and Shoulder Injury (left)    May returns for follow-up evaluation of her left shoulder and left thumb symptoms.  She is attended physical therapy and occupational therapy to help with her injuries and she is much improved.  She has not experiencing any pain to her thumb and describes only an occasional tightness sensation to her left trapezius.  She has been performing her home exercises and periodically using ice and heating pad to help with her shoulder symptoms.  She continues to work at regular duty status    Patient's place of employment -   Patient's job title - [employer]  Date of Injury - 09/11/23  Body part injured - Neck / Shoulder   Current work status per last visit - Regular  Improved, same, or worse - Improved  Pain Scale right now (1-10) -  1    Neck Injury   Pertinent negatives include no headaches or numbness.   Shoulder Injury   Pertinent negatives include no numbness.       Constitution: Negative. Negative for generalized weakness.   HENT: Negative.     Neck: neck negative. Negative for neck pain and neck stiffness.   Cardiovascular: Negative.    Eyes: Negative.    Respiratory: Negative.     Gastrointestinal: Negative.    Musculoskeletal:  Positive for abnormal ROM of joint. Negative for pain, joint pain, muscle cramps and muscle ache.   Skin: Negative.    Neurological: Negative.  Negative for headaches, numbness and tingling.   Psychiatric/Behavioral:  Negative for sleep disturbance.      Objective:     Physical Exam  Vitals and nursing note reviewed.   Constitutional:       General: She is not in acute distress.  HENT:      Head: Normocephalic.   Eyes:      General: Lids are normal.      Conjunctiva/sclera: Conjunctivae normal.   Neck:      Comments: No gross deformity noted to the cervical spine.  She describes occasional tightness but not pain to the left trapezius with cervical spine right  side-bending and rotation.  Otherwise she has full range of motion of her neck.  And no pain to her cervical spine.  Musculoskeletal:      Left shoulder: No swelling, deformity or tenderness. Normal range of motion. Normal strength. Normal pulse.      Left hand: No swelling, deformity or tenderness. Normal range of motion. Normal strength. Normal sensation. Normal pulse.      Cervical back: Normal range of motion. No pain with movement.      Comments: No gross deformity noted to her left shoulder.  She is full painless range of motion of her left shoulder and no instability.  Occasionally she will experience tightness to her left trapezius but not pain at the upper ranges of motion.    No gross deformity noted to her left hand or wrist.  She is full painless range of motion of her left thumb, left fingers, and left wrist.  There has no tenderness on palpation of her left hand or wrist.  Negative Finkelstein's testing.   Skin:     General: Skin is warm.      Capillary Refill: Capillary refill takes less than 2 seconds.   Neurological:      General: No focal deficit present.      Mental Status: She is alert.      Gait: Gait is intact.   Psychiatric:         Attention and Perception: Attention normal.         Mood and Affect: Mood and affect normal.         Speech: Speech normal.         Behavior: Behavior normal. Behavior is cooperative.         Thought Content: Thought content normal.        Assessment:      1. Strain of left trapezius muscle, subsequent encounter    2. Encounter related to worker's compensation claim    3. Strain of intrinsic muscle of left thumb      Plan:   I personally reviewed the physical therapy notes today.  She has been an active participant in therapy sessions and her condition has improved.  Today she only describes intermittent symptoms and there is no functional deficits noted on examination today.  I have asked her to continue with her home exercises.  Otherwise, I will release her from  Occupational Health Care as of today.    I spent a total of 30 minutes on the day of the visit.This includes face to face time and non-face to face time preparing to see the patient (eg, review of tests), obtaining and/or reviewing separately obtained history, documenting clinical information in the electronic or other health record, independently interpreting results and communicating results to the patient/family/caregiver, or care coordinator.            Restrictions: Regular Duty, Discharged from Occupational Health  Follow up if symptoms worsen or fail to improve.